# Patient Record
Sex: MALE | Race: WHITE | NOT HISPANIC OR LATINO | Employment: FULL TIME | URBAN - METROPOLITAN AREA
[De-identification: names, ages, dates, MRNs, and addresses within clinical notes are randomized per-mention and may not be internally consistent; named-entity substitution may affect disease eponyms.]

---

## 2017-04-06 ENCOUNTER — OFFICE VISIT (OUTPATIENT)
Dept: FAMILY MEDICINE | Facility: CLINIC | Age: 42
End: 2017-04-06
Payer: COMMERCIAL

## 2017-04-06 VITALS
BODY MASS INDEX: 31.36 KG/M2 | WEIGHT: 224 LBS | HEART RATE: 86 BPM | OXYGEN SATURATION: 100 % | HEIGHT: 71 IN | SYSTOLIC BLOOD PRESSURE: 144 MMHG | DIASTOLIC BLOOD PRESSURE: 90 MMHG

## 2017-04-06 DIAGNOSIS — Z23 NEED FOR VACCINATION: ICD-10-CM

## 2017-04-06 DIAGNOSIS — Z91.030 ALLERGIC TO BEES: ICD-10-CM

## 2017-04-06 DIAGNOSIS — Z80.42 FAMILY HISTORY OF PROSTATE CANCER: ICD-10-CM

## 2017-04-06 DIAGNOSIS — Z80.8 FAMILY HISTORY OF SKIN CANCER: ICD-10-CM

## 2017-04-06 DIAGNOSIS — I10 BENIGN ESSENTIAL HYPERTENSION: ICD-10-CM

## 2017-04-06 DIAGNOSIS — Z00.00 ROUTINE GENERAL MEDICAL EXAMINATION AT A HEALTH CARE FACILITY: Primary | ICD-10-CM

## 2017-04-06 PROCEDURE — 90471 IMMUNIZATION ADMIN: CPT | Performed by: PHYSICIAN ASSISTANT

## 2017-04-06 PROCEDURE — 90715 TDAP VACCINE 7 YRS/> IM: CPT | Performed by: PHYSICIAN ASSISTANT

## 2017-04-06 PROCEDURE — 99386 PREV VISIT NEW AGE 40-64: CPT | Mod: 25 | Performed by: PHYSICIAN ASSISTANT

## 2017-04-06 RX ORDER — EPINEPHRINE 0.3 MG/.3ML
INJECTION INTRAMUSCULAR
Refills: 0 | COMMUNITY
Start: 2016-06-10

## 2017-04-06 RX ORDER — EPINEPHRINE 0.3 MG/.3ML
0.3 INJECTION SUBCUTANEOUS PRN
Qty: 0.6 ML | Refills: 1 | Status: SHIPPED | OUTPATIENT
Start: 2017-04-06 | End: 2018-06-08

## 2017-04-06 RX ORDER — LOSARTAN POTASSIUM 50 MG/1
TABLET ORAL
Refills: 3 | COMMUNITY
Start: 2017-02-06

## 2017-04-06 NOTE — NURSING NOTE
"Chief Complaint   Patient presents with     Physical       Initial BP (!) 158/105  Pulse 86  Ht 5' 10.5\" (1.791 m)  Wt 224 lb (101.6 kg)  SpO2 100%  BMI 31.69 kg/m2 Estimated body mass index is 31.69 kg/(m^2) as calculated from the following:    Height as of this encounter: 5' 10.5\" (1.791 m).    Weight as of this encounter: 224 lb (101.6 kg).  Medication Reconciliation: complete  Shona Chandra CMA   Screening Questionnaire for Adult Immunization    Are you sick today?   No   Do you have allergies to medications, food, a vaccine component or latex?   No   Have you ever had a serious reaction after receiving a vaccination?   No   Do you have a long-term health problem with heart disease, lung disease, asthma, kidney disease, metabolic disease (e.g. diabetes), anemia, or other blood disorder?   No   Do you have cancer, leukemia, HIV/AIDS, or any other immune system problem?   No   In the past 3 months, have you taken medications that affect  your immune system, such as prednisone, other steroids, or anticancer drugs; drugs for the treatment of rheumatoid arthritis, Crohn s disease, or psoriasis; or have you had radiation treatments?   No   Have you had a seizure, or a brain or other nervous system problem?   No   During the past year, have you received a transfusion of blood or blood     products, or been given immune (gamma) globulin or antiviral drug?   No   For women: Are you pregnant or is there a chance you could become        pregnant during the next month?   No   Have you received any vaccinations in the past 4 weeks?   No     Immunization questionnaire answers were all negative.      MNVFC doesn't apply on this patient    Per orders of ADO, injection of tdap  given by Shona Hall. Patient instructed to remain in clinic for 20 minutes afterwards, and to report any adverse reaction to me immediately.       Screening performed by Shona Hall on 4/6/2017 at 6:12 PM.    "

## 2017-04-06 NOTE — MR AVS SNAPSHOT
After Visit Summary   4/6/2017    Dave Cherry    MRN: [id number]           Patient Information     Date Of Birth          1975        Visit Information        Provider Department      4/6/2017 5:50 PM Rebel Reyes PA-C [facility]        Today's Diagnoses     Routine general medical examination at a health care facility    -  1    Family history of prostate cancer        Need for vaccination        Family history of skin cancer        Allergic to bees          Care Instructions      Preventive Health Recommendations  Male Ages 40 to 49    Yearly exam:             See your health care provider every year in order to  o   Review health changes.   o   Discuss preventive care.    o   Review your medicines if your doctor has prescribed any.    You should be tested each year for STDs (sexually transmitted diseases) if you re at risk.     Have a cholesterol test every 5 years.     Have a colonoscopy (test for colon cancer) if someone in your family has had colon cancer or polyps before age 50.     After age 45, have a diabetes test (fasting glucose). If you are at risk for diabetes, you should have this test every 3 years.      Talk with your health care provider about whether or not a prostate cancer screening test (PSA) is right for you.    Shots: Get a flu shot each year. Get a tetanus shot every 10 years.     Nutrition:    Eat at least 5 servings of fruits and vegetables daily.     Eat whole-grain bread, whole-wheat pasta and brown rice instead of white grains and rice.     Talk to your provider about Calcium and Vitamin D.     Lifestyle    Exercise for at least 150 minutes a week (30 minutes a day, 5 days a week). This will help you control your weight and prevent disease.     Limit alcohol to one drink per day.     No smoking.     Wear sunscreen to prevent skin cancer.     See your dentist every six months for an exam and cleaning.            Follow-ups after your visit         Additional Services     DERMATOLOGY REFERRAL       Your provider has referred you to: [facility] - Brittny Dimas [phone]   [email or web address]    Please be aware that coverage of these services is subject to the terms and limitations of your health insurance plan.  Call member services at your health plan with any benefit or coverage questions.      Please bring the following with you to your appointment:    (1) Any X-Rays, CTs or MRIs which have been performed.  Contact the facility where they were done to arrange for  prior to your scheduled appointment.    (2) List of current medications  (3) This referral request   (4) Any documents/labs given to you for this referral                  Future tests that were ordered for you today     Open Future Orders        Priority Expected Expires Ordered    Lipid panel reflex to direct LDL Routine 5/6/2017 10/6/2017 4/6/2017    Basic metabolic panel Routine 5/6/2017 10/6/2017 4/6/2017    PSA, screen Routine 5/6/2017 10/6/2017 4/6/2017            Who to contact     If you have questions or need follow up information about today's clinic visit or your schedule please contact [facility] directly at [phone].  Normal or non-critical lab and imaging results will be communicated to you by MyChart, letter or phone within 4 business days after the clinic has received the results. If you do not hear from us within 7 days, please contact the clinic through MyChart or phone. If you have a critical or abnormal lab result, we will notify you by phone as soon as possible.  Submit refill requests through [employer] or call your pharmacy and they will forward the refill request to us. Please allow 3 business days for your refill to be completed.          Additional Information About Your Visit        [employer] Information     [employer] lets you send messages to your doctor, view your test results, renew your prescriptions,  "schedule appointments and more. To sign up, go to [email or web address] . Click on \"Log in\" on the left side of the screen, which will take you to the Welcome page. Then click on \"Sign up Now\" on the right side of the page.     You will be asked to enter the access code listed below, as well as some personal information. Please follow the directions to create your username and password.     Your access code is: [email or web address]  Expires: 2017  6:30 PM     Your access code will  in 90 days. If you need help or a new code, please call your [facility] clinic or [phone].        Care EveryWhere ID     This is your Care EveryWhere ID. This could be used by other organizations to access your [facility] medical records  [id number]        Your Vitals Were     Pulse Height Pulse Oximetry BMI (Body Mass Index)          86 5' 10.5\" (1.791 m) 100% 31.69 kg/m2         Blood Pressure from Last 3 Encounters:   17 (!) 142/96    Weight from Last 3 Encounters:   17 224 lb (101.6 kg)              We Performed the Following     1st  Administration  [55994]     DERMATOLOGY REFERRAL     TDAP VACCINE (ADACEL) [84688.002]          Today's Medication Changes          These changes are accurate as of: 17  6:30 PM.  If you have any questions, ask your nurse or doctor.               These medicines have changed or have updated prescriptions.        Dose/Directions    * EPIPEN 2-DEBORAH 0.3 MG/0.3ML injection   This may have changed:  Another medication with the same name was added. Make sure you understand how and when to take each.   Generic drug:  EPINEPHrine   Changed by:  Rebel Reyes PA-C        Reported on 2017   Refills:  0       * EPINEPHrine 0.3 MG/0.3ML injection   This may have changed:  You were already taking a medication with the same name, and this prescription was added. Make sure you understand how and when to take each.   Used for:  Allergic to bees   Changed by:  Rebel Reyes, " PA-C        Dose:  0.3 mg   Inject 0.3 mLs (0.3 mg) into the muscle as needed for anaphylaxis   Quantity:  0.6 mL   Refills:  1       * Notice:  This list has 2 medication(s) that are the same as other medications prescribed for you. Read the directions carefully, and ask your doctor or other care provider to review them with you.         Where to get your medicines      These medications were sent to [facility]/pharmacy #[id number] - [place], [place] - [address]  [address] [address]     Phone:  [phone]     EPINEPHrine 0.3 MG/0.3ML injection                Primary Care Provider Office Phone # Fax #    [facility] [phone] [address] [facility] [address]        Thank you!     Thank you for choosing [facility]  for your care. Our goal is always to provide you with excellent care. Hearing back from our patients is one way we can continue to improve our services. Please take a few minutes to complete the written survey that you may receive in the mail after your visit with us. Thank you!             Your Updated Medication List - Protect others around you: Learn how to safely use, store and throw away your medicines at www.disposemymeds.org.          This list is accurate as of: 4/6/17  6:30 PM.  Always use your most recent med list.                   Brand Name Dispense Instructions for use    * EPIPEN 2-DEBORAH 0.3 MG/0.3ML injection   Generic drug:  EPINEPHrine      Reported on 4/6/2017       * EPINEPHrine 0.3 MG/0.3ML injection     0.6 mL    Inject 0.3 mLs (0.3 mg) into the muscle as needed for anaphylaxis       losartan 50 MG tablet    COZAAR     TAKE 1 TABLET EVERY DAY       * Notice:  This list has 2 medication(s) that are the same as other medications prescribed for you. Read the directions carefully, and ask your doctor or other care provider to review them with you.

## 2017-04-06 NOTE — PROGRESS NOTES
SUBJECTIVE:     CC: Dave Cherry is an 41 year old male who presents for preventative health visit.     Healthy Habits:    Do you get at least three servings of calcium containing foods daily (dairy, green leafy vegetables, etc.)? yes     Amount of exercise or daily activities, outside of work: none recently     Problems taking medications regularly  -misses doses occasionally     Medication side effects: No    Have you had an eye exam in the past two years? yes    Do you see a dentist twice per year? Will be yes, was without insurance for a while and wasn't then.     Do you have sleep apnea, excessive snoring or daytime drowsiness?no    father recently passed away from skin cancer. Also had prostate cancer age 62? - would like to get prostate checked  Mole on back he would like evaluated      Today's PHQ-2 Score:   PHQ-2 ( 1999 Pfizer) 4/6/2017   Q1: Little interest or pleasure in doing things 0   Q2: Feeling down, depressed or hopeless 0   PHQ-2 Score 0       Abuse: Current or Past(Physical, Sexual or Emotional)- No  Do you feel safe in your environment - Yes    Social History   Substance Use Topics     Smoking status: Never Smoker     Smokeless tobacco: Not on file     Alcohol use Yes     The patient does not drink >3 drinks per day nor >7 drinks per week.    Last PSA: No results found for: PSA    No results for input(s): CHOL, HDL, LDL, TRIG, CHOLHDLRATIO, NHDL in the last 03694 hours.    Reviewed orders with patient. Reviewed health maintenance and updated orders accordingly - Yes    Reviewed and updated as needed this visit by clinical staff  Tobacco  Allergies  Meds  Problems  Med Hx  Surg Hx  Fam Hx  Soc Hx          Reviewed and updated as needed this visit by Provider  Allergies  Meds  Problems          Past Medical History:   Diagnosis Date     Hypertension       Past Surgical History:   Procedure Laterality Date     HEMORRHOIDECTOMY       ORTHOPEDIC SURGERY      right ankle surgery        ROS:  C: NEGATIVE for fever, chills, change in weight  I: NEGATIVE for worrisome rashes, moles or lesions  E: NEGATIVE for vision changes or irritation  ENT: NEGATIVE for ear, mouth and throat problems  R: NEGATIVE for significant cough or SOB  CV: NEGATIVE for chest pain, palpitations or peripheral edema  GI: NEGATIVE for nausea, abdominal pain, heartburn, or change in bowel habits   male: negative for dysuria, hematuria, decreased urinary stream, erectile dysfunction, urethral discharge  M: NEGATIVE for significant arthralgias or myalgia  N: NEGATIVE for weakness, dizziness or paresthesias  P: NEGATIVE for changes in mood or affect    Problem list, Medication list, Allergies, and Medical/Social/Surgical histories reviewed in [facility] and updated as appropriate.  Labs reviewed in EPIC  BP Readings from Last 3 Encounters:   17 144/90    Wt Readings from Last 3 Encounters:   17 224 lb (101.6 kg)                  Patient Active Problem List   Diagnosis     Family history of prostate cancer     Benign essential hypertension     Family history of skin cancer     Allergic to bees     BMI 31.0-31.9,adult     Past Surgical History:   Procedure Laterality Date     HEMORRHOIDECTOMY       ORTHOPEDIC SURGERY      right ankle surgery       Social History   Substance Use Topics     Smoking status: Never Smoker     Smokeless tobacco: Not on file     Alcohol use Yes     Family History   Problem Relation Age of Onset     Ovarian Cancer Mother      Stomach Cancer Mother      Prostate Cancer Father 61     Melanoma Father       age 71         Current Outpatient Prescriptions   Medication Sig Dispense Refill     losartan (COZAAR) 50 MG tablet TAKE 1 TABLET EVERY DAY  3     EPINEPHrine 0.3 MG/0.3ML injection Inject 0.3 mLs (0.3 mg) into the muscle as needed for anaphylaxis 0.6 mL 1     EPIPEN 2-DEBORAH 0.3 MG/0.3ML injection Reported on 2017  0     Allergies   Allergen Reactions     Bees      OBJECTIVE:     /90  " Pulse 86  Ht 5' 10.5\" (1.791 m)  Wt 224 lb (101.6 kg)  SpO2 100%  BMI 31.69 kg/m2  EXAM:  GENERAL: healthy, alert and no distress  EYES: Eyes grossly normal to inspection, PERRL and conjunctivae and sclerae normal  HENT: ear canals and TM's normal, nose and mouth without ulcers or lesions  NECK: no adenopathy, no asymmetry, masses, or scars and thyroid normal to palpation  RESP: lungs clear to auscultation - no rales, rhonchi or wheezes  CV: regular rate and rhythm, normal S1 S2, no S3 or S4, no murmur, click or rub, no peripheral edema and peripheral pulses strong  ABDOMEN: soft, nontender, no hepatosplenomegaly, no masses and bowel sounds normal   (male): normal male genitalia without lesions or urethral discharge, no hernia  MS: no gross musculoskeletal defects noted, no edema  SKIN: multiple skin moles  NEURO: Normal strength and tone, mentation intact and speech normal  PSYCH: mentation appears normal, affect normal/bright    ASSESSMENT/PLAN:         ICD-10-CM    1. Routine general medical examination at a health care facility Z00.00 Lipid panel reflex to direct LDL     Basic metabolic panel   2. Benign essential hypertension I10    3. Family history of prostate cancer Z80.42 PSA, screen   4. Family history of skin cancer Z80.8 DERMATOLOGY REFERRAL   5. Allergic to bees Z91.038 EPINEPHrine 0.3 MG/0.3ML injection   6. Need for vaccination Z23 TDAP VACCINE (ADACEL) [97467.002]     1st  Administration  [67977]   7. BMI 31.0-31.9,adult Z68.31    recheck blood pressure with pharmacy in 2 wks.   Work on Healthy diet and exercise. Getting heart rate elevated for 30 mins most days of week.  Recheck blood pressure in 6 months if at goal.   Follow up  With derm due to history of moles and fh.     COUNSELING:  Reviewed preventive health counseling, as reflected in patient instructions       Regular exercise       Healthy diet/nutrition       reports that he has never smoked. He does not have any smokeless tobacco " "history on file.    Estimated body mass index is 31.69 kg/(m^2) as calculated from the following:    Height as of this encounter: 5' 10.5\" (1.791 m).    Weight as of this encounter: 224 lb (101.6 kg).   Weight management plan: Discussed healthy diet and exercise guidelines and patient will follow up in 12 months in clinic to re-evaluate.    Counseling Resources:  ATP IV Guidelines  Pooled Cohorts Equation Calculator  FRAX Risk Assessment  ICSI Preventive Guidelines  Dietary Guidelines for Americans, 2010  USDA's MyPlate  ASA Prophylaxis  Lung CA Screening    Rebel Reyes PA-C  [facility]  "

## 2017-04-14 DIAGNOSIS — Z00.00 ROUTINE GENERAL MEDICAL EXAMINATION AT A HEALTH CARE FACILITY: ICD-10-CM

## 2017-04-14 DIAGNOSIS — Z80.42 FAMILY HISTORY OF PROSTATE CANCER: ICD-10-CM

## 2017-04-14 LAB
ANION GAP SERPL CALCULATED.3IONS-SCNC: 8 MMOL/L (ref 3–14)
BUN SERPL-MCNC: 15 MG/DL (ref 7–30)
CALCIUM SERPL-MCNC: 9.3 MG/DL (ref 8.5–10.1)
CHLORIDE SERPL-SCNC: 104 MMOL/L (ref 94–109)
CHOLEST SERPL-MCNC: 210 MG/DL
CO2 SERPL-SCNC: 25 MMOL/L (ref 20–32)
CREAT SERPL-MCNC: 1.15 MG/DL (ref 0.66–1.25)
GFR SERPL CREATININE-BSD FRML MDRD: 70 ML/MIN/1.7M2
GLUCOSE SERPL-MCNC: 115 MG/DL (ref 70–99)
HDLC SERPL-MCNC: 44 MG/DL
LDLC SERPL CALC-MCNC: 134 MG/DL
NONHDLC SERPL-MCNC: 166 MG/DL
POTASSIUM SERPL-SCNC: 4.4 MMOL/L (ref 3.4–5.3)
PSA SERPL-ACNC: 0.86 UG/L (ref 0–4)
SODIUM SERPL-SCNC: 137 MMOL/L (ref 133–144)
TRIGL SERPL-MCNC: 158 MG/DL

## 2017-04-14 PROCEDURE — 36415 COLL VENOUS BLD VENIPUNCTURE: CPT | Performed by: PHYSICIAN ASSISTANT

## 2017-04-14 PROCEDURE — 80061 LIPID PANEL: CPT | Performed by: PHYSICIAN ASSISTANT

## 2017-04-14 PROCEDURE — G0103 PSA SCREENING: HCPCS | Performed by: PHYSICIAN ASSISTANT

## 2017-04-14 PROCEDURE — 80048 BASIC METABOLIC PNL TOTAL CA: CPT | Performed by: PHYSICIAN ASSISTANT

## 2017-04-14 NOTE — PROGRESS NOTES
MrZac Cherry,    All of your labs were normal/  near normal  for you.    Please contact the clinic if you have additional questions.  Thank you.    Sincerely,    Rebel Reyes PA-C

## 2017-04-14 NOTE — LETTER
[facility]  [address] [address] [phone]        April 17, 2017    Dave Cherry  [address]  [place] [address]          Mr. Cherry,     All of your labs were normal/  near normal  for you.     Please contact the clinic if you have additional questions.  Thank you.     Sincerely,     Rebel MURRAYC/ks    Results for orders placed or performed in visit on 04/14/17   Lipid panel reflex to direct LDL   Result Value Ref Range    Cholesterol 210 (H) <200 mg/dL    Triglycerides 158 (H) <150 mg/dL    HDL Cholesterol 44 >39 mg/dL    LDL Cholesterol Calculated 134 (H) <100 mg/dL    Non HDL Cholesterol 166 (H) <130 mg/dL   Basic metabolic panel   Result Value Ref Range    Sodium 137 133 - 144 mmol/L    Potassium 4.4 3.4 - 5.3 mmol/L    Chloride 104 94 - 109 mmol/L    Carbon Dioxide 25 20 - 32 mmol/L    Anion Gap 8 3 - 14 mmol/L    Glucose 115 (H) 70 - 99 mg/dL    Urea Nitrogen 15 7 - 30 mg/dL    Creatinine 1.15 0.66 - 1.25 mg/dL    GFR Estimate 70 >60 mL/min/1.7m2    GFR Estimate If Black 85 >60 mL/min/1.7m2    Calcium 9.3 8.5 - 10.1 mg/dL   PSA, screen   Result Value Ref Range    PSA 0.86 0 - 4 ug/L

## 2017-06-08 ENCOUNTER — TELEPHONE (OUTPATIENT)
Dept: FAMILY MEDICINE | Facility: CLINIC | Age: 42
End: 2017-06-08

## 2017-06-08 NOTE — LETTER
[facility]  [address] [id number] [phone]          June 8, 2017    Dave Cherry  [address]  [place] [address]    Dave,      Our records indicate that you have not scheduled for a(n)blood pressure check with pharmacy which was recommended by your health care team.       Monitoring and managing your preventative and chronic health conditions are very important to us.     If you have received your health care elsewhere, please call the clinic so the information can be documented in your chart.    Please call [phone] or message us through your [email or web address] account to schedule an appointment or provide information for your chart.     I look forward to seeing you and working with you on your health care needs.     Sincerely,       Your [facility] Team/rb              *If you have already scheduled an appointment, please disregard this reminder

## 2017-06-08 NOTE — TELEPHONE ENCOUNTER
Panel Management Review      Patient has the following on his problem list:     Hypertension   Last three blood pressure readings:  BP Readings from Last 3 Encounters:   04/06/17 144/90   04/26/11 125/81     Blood pressure: FAILED    HTN Guidelines:  Age 18-59 BP range:  Less than 140/90  Age 60-85 with Diabetes:  Less than 140/90  Age 60-85 without Diabetes:  less than 150/90      Composite cancer screening  Chart review shows that this patient is due/due soon for the following None  Summary:    Patient is due/failing the following:   BP CHECK    Action needed:   BP check with pharmacy    Type of outreach:    Sent letter.    Questions for provider review:    None                                                                                                                                    Shona Chandra CMA       Chart routed to closed .

## 2017-09-15 ENCOUNTER — TELEPHONE (OUTPATIENT)
Dept: FAMILY MEDICINE | Facility: CLINIC | Age: 42
End: 2017-09-15

## 2017-09-15 NOTE — TELEPHONE ENCOUNTER
Panel Management Review      Patient has the following on his problem list:     Hypertension   Last three blood pressure readings:  BP Readings from Last 3 Encounters:   04/06/17 144/90   04/26/11 125/81     Blood pressure: FAILED    HTN Guidelines:  Age 18-59 BP range:  Less than 140/90  Age 60-85 with Diabetes:  Less than 140/90  Age 60-85 without Diabetes:  less than 150/90        Composite cancer screening  Chart review shows that this patient is due/due soon for the following None  Summary:    Patient is due/failing the following:   BP CHECK    Action needed:   Patient needs office visit for hypertension.    Type of outreach:    Sent letter.    Questions for provider review:    None                                                                                                                                    Shona Chandra CMA       Chart routed to closed .

## 2017-09-15 NOTE — LETTER
[facility]  [address] [address]  Phone: [phone]    09/15/17    Dave Cherry  [address]  [place] [address]      To whom it may concern:     Dave,      Our records indicate that you have not scheduled for a(n)appointment with  Rebel Reyes PA-C which was recommended by your health care team.       Monitoring and managing your preventative and chronic health conditions are very important to us.     If you have received your health care elsewhere, please call the clinic so the information can be documented in your chart.    Please call [phone] or message us through your [email or web address] account to schedule an appointment or provide information for your chart.     I look forward to seeing you and working with you on your health care needs.     Sincerely,       Your [facility]/rb              *If you have already scheduled an appointment, please disregard this reminder                        Sincerely,      Rebel Reyes PA-C

## 2017-11-25 ENCOUNTER — OFFICE VISIT (OUTPATIENT)
Dept: URGENT CARE | Facility: URGENT CARE | Age: 42
End: 2017-11-25
Payer: COMMERCIAL

## 2017-11-25 ENCOUNTER — RADIANT APPOINTMENT (OUTPATIENT)
Dept: GENERAL RADIOLOGY | Facility: CLINIC | Age: 42
End: 2017-11-25
Payer: COMMERCIAL

## 2017-11-25 VITALS
OXYGEN SATURATION: 98 % | BODY MASS INDEX: 30.81 KG/M2 | DIASTOLIC BLOOD PRESSURE: 94 MMHG | SYSTOLIC BLOOD PRESSURE: 133 MMHG | HEART RATE: 74 BPM | WEIGHT: 217.8 LBS | TEMPERATURE: 98.2 F

## 2017-11-25 DIAGNOSIS — S99.922A INJURY OF TOE ON LEFT FOOT, INITIAL ENCOUNTER: Primary | ICD-10-CM

## 2017-11-25 DIAGNOSIS — S99.922A INJURY OF TOE ON LEFT FOOT, INITIAL ENCOUNTER: ICD-10-CM

## 2017-11-25 DIAGNOSIS — S90.222A SUBUNGUAL HEMATOMA OF TOE OF LEFT FOOT, INITIAL ENCOUNTER: ICD-10-CM

## 2017-11-25 PROCEDURE — 73660 X-RAY EXAM OF TOE(S): CPT | Mod: LT

## 2017-11-25 PROCEDURE — 99213 OFFICE O/P EST LOW 20 MIN: CPT | Performed by: PHYSICIAN ASSISTANT

## 2017-11-25 NOTE — PROGRESS NOTES
SUBJECTIVE:   Dave Cherry is a 42 year old male who presents to clinic today for the following health issues:      Musculoskeletal problem/ Left toe pain      Duration: 1 day ago    Description  Location: left toe    Intensity:  8/10    Accompanying signs and symptoms: swelling and Discoloration (blue- greenish), throbbing pain    History  Previous similar problem: no   Previous evaluation:  none    Precipitating or alleviating factors:  Trauma or overuse: no   Aggravating factors include: walking    Therapies tried and outcome: ice and acetaminophen( tylenol)- didn't help to much     dropped a heavy sign on it.    TDAP UTD PER MIIC             Allergies   Allergen Reactions     Bees        Patient Active Problem List   Diagnosis     Family history of prostate cancer     Benign essential hypertension     Family history of skin cancer     Allergic to bees     BMI 31.0-31.9,adult       Past Medical History:   Diagnosis Date     Hypertension          Current Outpatient Prescriptions on File Prior to Visit:  EPIPEN 2-DEBORAH 0.3 MG/0.3ML injection Reported on 4/6/2017   losartan (COZAAR) 50 MG tablet TAKE 1 TABLET EVERY DAY   EPINEPHrine 0.3 MG/0.3ML injection Inject 0.3 mLs (0.3 mg) into the muscle as needed for anaphylaxis   ibuprofen (ADVIL,MOTRIN) 600 MG tablet Take 1 tablet by mouth every 8 hours as needed for pain.   atenolol (TENORMIN) 100 MG tablet Take 100 mg by mouth daily.   Citalopram Hydrobromide (CELEXA PO) Take  by mouth.     No current facility-administered medications on file prior to visit.     Social History   Substance Use Topics     Smoking status: Never Smoker     Smokeless tobacco: Not on file     Alcohol use Yes      Comment: occ       ROS:  GEN: no fever  SKIN: as above  Musculoskel: + as above    OBJECTIVE:  BP (!) 133/94 (BP Location: Left arm, Patient Position: Chair, Cuff Size: Adult Large)  Pulse 74  Temp 98.2  F (36.8  C) (Oral)  Wt 217 lb 12.8 oz (98.8 kg)  SpO2 98%  BMI 30.81  kg/m2   General:   awake, alert, and cooperative.  NAD.   Head: Normocephalic, atraumatic.  Eyes: Conjunctiva clear,   MS:  Left 1st toe with subungual hematoma, no other TTP or swelling. . Sensory intact.   Cap refill intact.    Neuro: Alert and oriented - normal speech.    My independent read of XR is  no fx/fb        ASSESSMENT:    ICD-10-CM    1. Injury of toe on left foot, initial encounter S99.922A XR Toe Left G/E 2 Views   2. Subungual hematoma of toe of left foot, initial encounter S90.222A            PLAN: verbal consent received, ice pack placed on toe then hematoma released with electric cautery.  bandaid applied, patient felt better after procedure and tolerated it well.   Advised about symptoms which might herald more serious problems.

## 2017-11-25 NOTE — MR AVS SNAPSHOT
After Visit Summary   11/25/2017    Dave Cherry    MRN: [id number]           Patient Information     Date Of Birth          1975        Visit Information        Provider Department      11/25/2017 12:00 PM Johnny Landaverde PA [facility]        Today's Diagnoses     Injury of toe on left foot, initial encounter    -  1    Subungual hematoma of toe of left foot, initial encounter          Care Instructions      Subungual Hematoma  A subungual hematoma is blood under the nail. It can occur when your finger or toe is hit or crushed. It causes the nail to look blue. In some cases, you may fracture the bone under the nail.   If the bruise is small and not too painful, it will heal without treatment. If the bruise is large and painful, you may need to have the blood drained.  If a large area of the nail is damaged, your doctor may want to remove it. If he or she does not remove the nail, it may become loose or fall off in the next 2 weeks. In almost all cases, the nail will grow back from the area under the cuticle called the matrix. This takes a few weeks to start and is complete in about 4 to 6 months for a fingernail and 12 months for a toenail. If the nail bed or matrix was damaged, the nail may grow back with a rough or irregular shape. Sometimes the nail may not regrow at all.  Home care  The following guidelines will help you care for your wound at home:    Apply an ice pack (ice cubes in a plastic bag, wrapped in a thin towel) for no more than 20 minutes every 3 to 6 hours for the first 1 to 2 days. Continue this, as needed, 3 to 4 times a day until the pain and swelling goes away.    If the nail was drained:    Keep the nail covered with a clean adhesive bandage for the next 2 days. There may be some oozing of blood during that time, so change the bandage as needed.    Rinse the finger or toe once a day under warm running water. Clean any crust away with a  cotton-tipped applicator soaked in soapy water.    If the nail was removed:    The nail bed (tissue under the nail) is moist, soft and sensitive. This needs to be protected from injury for the first 7 to 10 days until it dries out and becomes hard. Keep it covered with a dressing or adhesive bandage until that time.    Bandages tend to stick to a newly exposed nail bed and can be hard to remove if left in place more than 24 hours. Therefore, unless you were told otherwise, change dressings every 24 hours. Apply petroleum jelly and then a non-stick dressing. This will keep the bandage from sticking and make it easier to remove.  If necessary, soak the dressing off while holding your finger or toe under warm running water.    If an X-ray showed a fracture, protect the finger or toe for 3 to 4 weeks while it is healing.  Here is some information regarding medicine and your wound:    You can take over-the-counter pain medicine such as acetaminophen for pain, unless you were given a different pain medicine to use. Talk with your healthcare provider before using this medicine if you have chronic liver or kidney disease. Also talk with your provider if you have had a stomach ulcer or digestive tract bleeding, or you are taking blood-thinner medicine.    If you were given antibiotics, take them until they are used up. It is important to finish the antibiotics even if the wound looks better. This will ensure the infection has cleared.  Follow-up care  Follow up with your doctor, or as advised. If the nail was drained, there is a small risk of infection. Watch carefully for the signs listed below.  When to seek medical advice  Call your doctor right away if any of these occur:    Increasing redness around the nail    Increasing local pain or swelling    Pus draining from the nail    Fever of 100.4 F (38 C) or higher, or as directed by your healthcare provider  Date Last Reviewed: 9/1/2016 2000-2017 [employer]  "[employer] [address] [place] [address]. All rights reserved. This information is not intended as a substitute for professional medical care. Always follow your healthcare professional's instructions.                Follow-ups after your visit        Follow-up notes from your care team     Return if symptoms worsen or fail to improve.      Who to contact     If you have questions or need follow up information about today's clinic visit or your schedule please contact [facility] directly at [phone].  Normal or non-critical lab and imaging results will be communicated to you by MyChart, letter or phone within 4 business days after the clinic has received the results. If you do not hear from us within 7 days, please contact the clinic through [employer] or phone. If you have a critical or abnormal lab result, we will notify you by phone as soon as possible.  Submit refill requests through [employer] or call your pharmacy and they will forward the refill request to us. Please allow 3 business days for your refill to be completed.          Additional Information About Your Visit        [employer] Information     [employer] lets you send messages to your doctor, view your test results, renew your prescriptions, schedule appointments and more. To sign up, go to [email or web address] . Click on \"Log in\" on the left side of the screen, which will take you to the Welcome page. Then click on \"Sign up Now\" on the right side of the page.     You will be asked to enter the access code listed below, as well as some personal information. Please follow the directions to create your username and password.     Your access code is: [email or web address]  Expires: 2018  1:32 PM     Your access code will  in 90 days. If you need help or a new code, please call your [place] clinic or [phone].        Care EveryWhere ID     This is your Care EveryWhere ID. This could be used by other organizations to access " your [facility] medical records  [id number]        Your Vitals Were     Pulse Temperature Pulse Oximetry BMI (Body Mass Index)          74 98.2  F (36.8  C) (Oral) 98% 30.81 kg/m2         Blood Pressure from Last 3 Encounters:   11/25/17 (!) 133/94   04/06/17 144/90   04/26/11 125/81    Weight from Last 3 Encounters:   11/25/17 217 lb 12.8 oz (98.8 kg)   04/06/17 224 lb (101.6 kg)   04/26/11 220 lb (99.8 kg)               Primary Care Provider Office Phone # Fax #    [facility] [phone] [address] [address] [address]        Equal Access to Services     [employer] : Hadii aad ku hadasho Soomaali, waaxda luqadaha, qaybta kaalmada adeegyada, judy barron. So [facility] [phone].    ATENCIÓN: Si habla español, tiene a sanders disposición servicios gratuitos de asistencia lingüística. Llame al [phone].    We comply with applicable federal civil rights laws and [place] laws. We do not discriminate on the basis of race, color, national origin, age, disability, sex, sexual orientation, or gender identity.            Thank you!     Thank you for choosing [facility]  for your care. Our goal is always to provide you with excellent care. Hearing back from our patients is one way we can continue to improve our services. Please take a few minutes to complete the written survey that you may receive in the mail after your visit with us. Thank you!             Your Updated Medication List - Protect others around you: Learn how to safely use, store and throw away your medicines at www.disposemymeds.org.          This list is accurate as of: 11/25/17  1:32 PM.  Always use your most recent med list.                   Brand Name Dispense Instructions for use Diagnosis    atenolol 100 MG tablet    TENORMIN     Take 100 mg by mouth daily.        CELEXA PO      Take  by mouth.        * EPIPEN 2-DEBORAH 0.3 MG/0.3ML injection 2-pack   Generic drug:   EPINEPHrine      Reported on 4/6/2017        * EPINEPHrine 0.3 MG/0.3ML injection 2-pack    EPIPEN/ADRENACLICK/or ANY BX GENERIC EQUIV    0.6 mL    Inject 0.3 mLs (0.3 mg) into the muscle as needed for anaphylaxis    Allergic to bees       ibuprofen 600 MG tablet    ADVIL/MOTRIN    100 tablet    Take 1 tablet by mouth every 8 hours as needed for pain.    Toe injury, Work-related condition       losartan 50 MG tablet    COZAAR     TAKE 1 TABLET EVERY DAY        * Notice:  This list has 2 medication(s) that are the same as other medications prescribed for you. Read the directions carefully, and ask your doctor or other care provider to review them with you.

## 2017-12-28 ENCOUNTER — TELEPHONE (OUTPATIENT)
Dept: FAMILY MEDICINE | Facility: CLINIC | Age: 42
End: 2017-12-28

## 2017-12-28 NOTE — TELEPHONE ENCOUNTER
Panel Management Review      Patient has the following on his problem list:     Hypertension   Last three blood pressure readings:  BP Readings from Last 3 Encounters:   11/25/17 (!) 133/94   04/06/17 144/90   04/26/11 125/81     Blood pressure: FAILED    HTN Guidelines:  Age 18-59 BP range:  Less than 140/90  Age 60-85 with Diabetes:  Less than 140/90  Age 60-85 without Diabetes:  less than 150/90        Composite cancer screening  Chart review shows that this patient is due/due soon for the following None  Summary:    Patient is due/failing the following:   BP CHECK    Action needed:   Patient needs office visit for hypertension.    Type of outreach:    Sent letter.    Questions for provider review:    None                                                                                                                                    Shona Chandra CMA       Chart routed to closed .

## 2017-12-28 NOTE — LETTER
[facility]  [address] [address] [address]  Phone: [phone]    12/28/17    Dave Cherry  [address]  [place] [address]      To whom it may concern:     Dave,      Our records indicate that you have not scheduled for a(n)appointment with  Rebel Reyes PA-C which was recommended by your health care team.     Monitoring and managing your preventative and chronic health conditions are very important to us.     If you have received your health care elsewhere, please call the clinic so the information can be documented in your chart.    Please call [phone] or message us through your [employer] account to schedule an appointment or provide information for your chart.     I look forward to seeing you and working with you on your health care needs.     Sincerely,       Your [place] [facility] [facility] Team/rb              *If you have already scheduled an appointment, please disregard this reminder

## 2018-04-13 ENCOUNTER — TELEPHONE (OUTPATIENT)
Dept: FAMILY MEDICINE | Facility: CLINIC | Age: 43
End: 2018-04-13

## 2018-04-13 NOTE — LETTER
[facility]  [address] [address] [facility] [address]  Phone: [phone]    04/13/18    Dave Cherry  [address]  [place] [address]      To whom it may concern:     Dave,      Our records indicate that you have not scheduled for a(n)appointment with  Rebel Reyes PA-C which was recommended by your health care team.     Monitoring and managing your preventative and chronic health conditions are very important to us.     If you have received your health care elsewhere, please call the clinic so the information can be documented in your chart.    Please call [phone] or message us through your [employer] account to schedule an appointment or provide information for your chart.     I look forward to seeing you and working with you on your health care needs.     Sincerely,       Your [place] [facility] Team/rb              *If you have already scheduled an appointment, please disregard this reminder

## 2018-06-08 DIAGNOSIS — Z91.030 ALLERGIC TO BEES: ICD-10-CM

## 2018-06-08 RX ORDER — EPINEPHRINE 0.3 MG/.3ML
INJECTION SUBCUTANEOUS
Qty: 0.6 ML | Refills: 0 | Status: SHIPPED | OUTPATIENT
Start: 2018-06-08

## 2018-08-07 ENCOUNTER — OFFICE VISIT (OUTPATIENT)
Dept: URGENT CARE | Facility: URGENT CARE | Age: 43
End: 2018-08-07
Payer: COMMERCIAL

## 2018-08-07 ENCOUNTER — RADIANT APPOINTMENT (OUTPATIENT)
Dept: GENERAL RADIOLOGY | Facility: CLINIC | Age: 43
End: 2018-08-07
Attending: PHYSICIAN ASSISTANT
Payer: COMMERCIAL

## 2018-08-07 VITALS
WEIGHT: 213 LBS | OXYGEN SATURATION: 97 % | HEART RATE: 64 BPM | TEMPERATURE: 97.8 F | SYSTOLIC BLOOD PRESSURE: 127 MMHG | DIASTOLIC BLOOD PRESSURE: 79 MMHG | BODY MASS INDEX: 30.13 KG/M2

## 2018-08-07 DIAGNOSIS — S49.91XA INJURY OF RIGHT UPPER ARM, INITIAL ENCOUNTER: ICD-10-CM

## 2018-08-07 DIAGNOSIS — S62.354A CLOSED NONDISPLACED FRACTURE OF SHAFT OF FOURTH METACARPAL BONE OF RIGHT HAND, INITIAL ENCOUNTER: Primary | ICD-10-CM

## 2018-08-07 PROCEDURE — 73130 X-RAY EXAM OF HAND: CPT | Mod: RT

## 2018-08-07 PROCEDURE — 73030 X-RAY EXAM OF SHOULDER: CPT | Mod: RT

## 2018-08-07 PROCEDURE — 99214 OFFICE O/P EST MOD 30 MIN: CPT | Performed by: PHYSICIAN ASSISTANT

## 2018-08-07 RX ORDER — HYDROCODONE BITARTRATE AND ACETAMINOPHEN 5; 325 MG/1; MG/1
1 TABLET ORAL EVERY 4 HOURS PRN
Qty: 15 TABLET | Refills: 0 | Status: SHIPPED | OUTPATIENT
Start: 2018-08-07 | End: 2018-08-10

## 2018-08-07 ASSESSMENT — ENCOUNTER SYMPTOMS
WEIGHT LOSS: 0
CONSTITUTIONAL NEGATIVE: 1
PALPITATIONS: 0
HEMOPTYSIS: 0
RESPIRATORY NEGATIVE: 1
CARDIOVASCULAR NEGATIVE: 1
EYE PAIN: 0
FALLS: 1
GASTROINTESTINAL NEGATIVE: 1
DIAPHORESIS: 0
FEVER: 0
COUGH: 0

## 2018-08-07 NOTE — MR AVS SNAPSHOT
After Visit Summary   8/7/2018    Dave Cherry    MRN: [id number]           Patient Information     Date Of Birth          1975        Visit Information        Provider Department      8/7/2018 8:15 PM Tamie Brown PA-C [facility]        Today's Diagnoses     Closed nondisplaced fracture of shaft of fourth metacarpal bone of right hand, initial encounter    -  1    Injury of right upper arm, initial encounter           Follow-ups after your visit        Additional Services     ORTHO  REFERRAL       [facility] is referring you to the Orthopedic  Services at [facility].       The  Representative will assist you in the coordination of your Orthopedic and Musculoskeletal Care as prescribed by your physician.    The  Representative will call you within 1 business day to help schedule your appointment, or you may contact the  Representative at:    All areas ~ [phone]     Type of Referral : Non Surgical       Timeframe requested: 1 - 2 days    Coverage of these services is subject to the terms and limitations of your health insurance plan.  Please call member services at your health plan with any benefit or coverage questions.      If X-rays, CT or MRI's have been performed, please contact the facility where they were done to arrange for , prior to your scheduled appointment.  Please bring this referral request to your appointment and present it to your specialist.                  Who to contact     If you have questions or need follow up information about today's clinic visit or your schedule please contact [facility] directly at [phone].  Normal or non-critical lab and imaging results will be communicated to you by MyChart, letter or phone within 4 business days after the clinic has received the results. If you do not hear from us within 7 days, please contact the  clinic through [employer] or phone. If you have a critical or abnormal lab result, we will notify you by phone as soon as possible.  Submit refill requests through [employer] or call your pharmacy and they will forward the refill request to us. Please allow 3 business days for your refill to be completed.          Additional Information About Your Visit        Care EveryWhere ID     This is your Care EveryWhere ID. This could be used by other organizations to access your [place] medical records  [id number]        Your Vitals Were     Pulse Temperature Pulse Oximetry BMI (Body Mass Index)          64 97.8  F (36.6  C) (Oral) 97% 30.13 kg/m2         Blood Pressure from Last 3 Encounters:   08/07/18 127/79   11/25/17 (!) 133/94   04/06/17 144/90    Weight from Last 3 Encounters:   08/07/18 213 lb (96.6 kg)   11/25/17 217 lb 12.8 oz (98.8 kg)   04/06/17 224 lb (101.6 kg)              We Performed the Following     ORTHO  REFERRAL     XR Hand Right G/E 3 Views     XR Shoulder Right G/E 3 Views          Today's Medication Changes          These changes are accurate as of 8/7/18  8:43 PM.  If you have any questions, ask your nurse or doctor.               Start taking these medicines.        Dose/Directions    HYDROcodone-acetaminophen 5-325 MG per tablet   Commonly known as:  NORCO   Used for:  Closed nondisplaced fracture of shaft of fourth metacarpal bone of right hand, initial encounter   Started by:  Tamie Brown PA-C        Dose:  1 tablet   Take 1 tablet by mouth every 4 hours as needed for pain   Quantity:  15 tablet   Refills:  0            Where to get your medicines      Some of these will need a paper prescription and others can be bought over the counter.  Ask your nurse if you have questions.     Bring a paper prescription for each of these medications     HYDROcodone-acetaminophen 5-325 MG per tablet               Information about OPIOIDS     PRESCRIPTION OPIOIDS: WHAT YOU NEED TO KNOW   We gave you  an opioid (narcotic) pain medicine. It is important to manage your pain, but opioids are not always the best choice. You should first try all the other options your care team gave you. Take this medicine for as short a time (and as few doses) as possible.    Some activities can increase your pain, such as bandage changes or therapy sessions. It may help to take your pain medicine 30 to 60 minutes before these activities. Reduce your stress by getting enough sleep, working on hobbies you enjoy and practicing relaxation or meditation. Talk to your care team about ways to manage your pain beyond prescription opioids.    These medicines have risks:    DO NOT drive when on new or higher doses of pain medicine. These medicines can affect your alertness and reaction times, and you could be arrested for driving under the influence (DUI). If you need to use opioids long-term, talk to your care team about driving.    DO NOT operate heavy machinery    DO NOT do any other dangerous activities while taking these medicines.    DO NOT drink any alcohol while taking these medicines.     If the opioid prescribed includes acetaminophen, DO NOT take with any other medicines that contain acetaminophen. Read all labels carefully. Look for the word  acetaminophen  or  Tylenol.  Ask your pharmacist if you have questions or are unsure.    You can get addicted to pain medicines, especially if you have a history of addiction (chemical, alcohol or substance dependence). Talk to your care team about ways to reduce this risk.    All opioids tend to cause constipation. Drink plenty of water and eat foods that have a lot of fiber, such as fruits, vegetables, prune juice, apple juice and high-fiber cereal. Take a laxative (Miralax, milk of magnesia, Colace, Senna) if you don t move your bowels at least every other day. Other side effects include upset stomach, sleepiness, dizziness, throwing up, tolerance (needing more of the medicine to have the  same effect), physical dependence and slowed breathing.    Store your pills in a secure place, locked if possible. We will not replace any lost or stolen medicine. If you don t finish your medicine, please throw away (dispose) as directed by your pharmacist. The [place] Pollution Control Agency has more information about safe disposal: https://www.pca.[facility].mn.us/living-green/managing-unwanted-medications         Primary Care Provider Office Phone # Fax #    [facility] [phone] [address] [address]        Equal Access to Services     [facility] : Hadii aad ku hadasho Soomaali, waaxda luqadaha, qaybta kaalmada adeegyada, judy angel . So [facility] [phone].    ATENCIÓN: Si habla español, tiene a sanders disposición servicios gratuitos de asistencia lingüística. Llame al [phone].    We comply with applicable federal civil rights laws and [place] laws. We do not discriminate on the basis of race, color, national origin, age, disability, sex, sexual orientation, or gender identity.            Thank you!     Thank you for choosing [facility]  for your care. Our goal is always to provide you with excellent care. Hearing back from our patients is one way we can continue to improve our services. Please take a few minutes to complete the written survey that you may receive in the mail after your visit with us. Thank you!             Your Updated Medication List - Protect others around you: Learn how to safely use, store and throw away your medicines at www.disposemymeds.org.          This list is accurate as of 8/7/18  8:43 PM.  Always use your most recent med list.                   Brand Name Dispense Instructions for use Diagnosis    CELEXA PO      Take  by mouth.        * EPIPEN 2-DEBORAH 0.3 MG/0.3ML injection 2-pack   Generic drug:  EPINEPHrine      Reported on 4/6/2017        * EPINEPHrine 0.3 MG/0.3ML injection 2-pack     EPIPEN/ADRENACLICK/or ANY BX GENERIC EQUIV    0.6 mL    INJECT 0.3 MLS (0.3 MG) INTO THE MUSCLE AS NEEDED FOR ANAPHYLAXIS    Allergic to bees       HYDROcodone-acetaminophen 5-325 MG per tablet    NORCO    15 tablet    Take 1 tablet by mouth every 4 hours as needed for pain    Closed nondisplaced fracture of shaft of fourth metacarpal bone of right hand, initial encounter       ibuprofen 600 MG tablet    ADVIL/MOTRIN    100 tablet    Take 1 tablet by mouth every 8 hours as needed for pain.    Toe injury, Work-related condition       losartan 50 MG tablet    COZAAR     TAKE 1 TABLET EVERY DAY        * Notice:  This list has 2 medication(s) that are the same as other medications prescribed for you. Read the directions carefully, and ask your doctor or other care provider to review them with you.
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
## 2018-08-10 ENCOUNTER — OFFICE VISIT (OUTPATIENT)
Dept: ORTHOPEDICS | Facility: CLINIC | Age: 43
End: 2018-08-10
Payer: COMMERCIAL

## 2018-08-10 VITALS
DIASTOLIC BLOOD PRESSURE: 82 MMHG | WEIGHT: 217.2 LBS | HEIGHT: 70 IN | BODY MASS INDEX: 31.09 KG/M2 | SYSTOLIC BLOOD PRESSURE: 140 MMHG

## 2018-08-10 DIAGNOSIS — S62.324A CLOSED DISPLACED FRACTURE OF SHAFT OF FOURTH METACARPAL BONE OF RIGHT HAND, INITIAL ENCOUNTER: ICD-10-CM

## 2018-08-10 PROCEDURE — 26600 TREAT METACARPAL FRACTURE: CPT | Performed by: PEDIATRICS

## 2018-08-10 PROCEDURE — 99204 OFFICE O/P NEW MOD 45 MIN: CPT | Mod: 57 | Performed by: PEDIATRICS

## 2018-08-10 RX ORDER — HYDROCODONE BITARTRATE AND ACETAMINOPHEN 5; 325 MG/1; MG/1
1 TABLET ORAL EVERY 6 HOURS PRN
Qty: 15 TABLET | Refills: 0 | Status: SHIPPED | OUTPATIENT
Start: 2018-08-10 | End: 2018-08-21
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
## 2018-08-10 NOTE — MR AVS SNAPSHOT
After Visit Summary   8/10/2018    Dave Cherry    MRN: [id number]           Patient Information     Date Of Birth          1975        Visit Information        Provider Department      8/10/2018 2:20 PM Jose Epstein DO [facility] [place]        Today's Diagnoses     Closed displaced fracture of shaft of fourth metacarpal bone of right hand, initial encounter          Care Instructions       Caring for Your Cast     A cast is used to protect an injured body part and allow it to heal by limiting the amount of motion occurring around the injury. Pain and swelling of the injured area is normal for 48 hours after your cast is put on. If you have swelling, wiggle your toes or fingers to ease it. Doing so encourages blood flow to your arm or leg.     It is important that you keep your cast dry, unless your doctor tells you differently. If the padding of the cast gets wet, your skin may be damaged and become infected. When showering or taking a bath, put the cast in a heavy plastic bag that can be held in place with a rubber band. If your cast gets wet and does not dry out in four to five hours, call your doctor s office.   To keep the cast clean, use wash clothes or baby wipes around it.   You may experience some itching inside the cast. This is normal. Avoid putting anything in the cast, even your finger, as you can injure your skin and cause infection. Try shaking some talcum powder or blowing cool air from a hair dryer into the cast to ease itching.   If these signs or symptoms develop, call your doctor immediately.       Pain gets worse     Swelling that cuts off blood flow that does not go away, even when you lift the body part above the level of your heart     Fever after itching. It may be related to an infection.     Fluid draining from your skin under the cast     Your cast may become loose as swelling goes down. If the cast feels too loose or if it is so  "loose you can take it off, call your doctor s office.     Your doctor or  will give you recommendations for activity based on your injury. Some sports allow casts if properly padded by a doctor or .     For complete healing, your cast should only be removed at the direction of your doctor or clinic staff. A special saw ensures its safe removal and protects the skin and other tissue under the cast.             Follow-ups after your visit        Follow-up notes from your care team     Return in about 1 week (around 8/17/2018).      Your next 10 appointments already scheduled     Aug 17, 2018  2:20 PM CDT   Return Visit with Jose Epstein,    [facility] [place] ([facility] [place])    [address]  [place] [address] [phone]              Who to contact     If you have questions or need follow up information about today's clinic visit or your schedule please contact [facility] [place] directly at [phone].  Normal or non-critical lab and imaging results will be communicated to you by MyChart, letter or phone within 4 business days after the clinic has received the results. If you do not hear from us within 7 days, please contact the clinic through MyChart or phone. If you have a critical or abnormal lab result, we will notify you by phone as soon as possible.  Submit refill requests through [employer] or call your pharmacy and they will forward the refill request to us. Please allow 3 business days for your refill to be completed.          Additional Information About Your Visit        Care EveryWhere ID     This is your Care EveryWhere ID. This could be used by other organizations to access your [facility] medical records  [id number]        Your Vitals Were     Height BMI (Body Mass Index)                5' 9.69\" (1.77 m) 31.45 kg/m2           Blood Pressure from Last 3 Encounters: "   08/10/18 140/82   08/07/18 127/79   11/25/17 (!) 133/94    Weight from Last 3 Encounters:   08/10/18 217 lb 3.2 oz (98.5 kg)   08/07/18 213 lb (96.6 kg)   11/25/17 217 lb 12.8 oz (98.8 kg)              We Performed the Following     Cast application          Today's Medication Changes          These changes are accurate as of 8/10/18 11:59 PM.  If you have any questions, ask your nurse or doctor.               These medicines have changed or have updated prescriptions.        Dose/Directions    HYDROcodone-acetaminophen 5-325 MG per tablet   Commonly known as:  NORCO   This may have changed:  when to take this   Used for:  Closed displaced fracture of shaft of fourth metacarpal bone of right hand, initial encounter        Dose:  1 tablet   Take 1 tablet by mouth every 6 hours as needed for pain   Quantity:  15 tablet   Refills:  0            Where to get your medicines      Some of these will need a paper prescription and others can be bought over the counter.  Ask your nurse if you have questions.     Bring a paper prescription for each of these medications     HYDROcodone-acetaminophen 5-325 MG per tablet               Information about OPIOIDS     PRESCRIPTION OPIOIDS: WHAT YOU NEED TO KNOW   We gave you an opioid (narcotic) pain medicine. It is important to manage your pain, but opioids are not always the best choice. You should first try all the other options your care team gave you. Take this medicine for as short a time (and as few doses) as possible.    Some activities can increase your pain, such as bandage changes or therapy sessions. It may help to take your pain medicine 30 to 60 minutes before these activities. Reduce your stress by getting enough sleep, working on hobbies you enjoy and practicing relaxation or meditation. Talk to your care team about ways to manage your pain beyond prescription opioids.    These medicines have risks:    DO NOT drive when on new or higher doses of pain medicine. These  medicines can affect your alertness and reaction times, and you could be arrested for driving under the influence (DUI). If you need to use opioids long-term, talk to your care team about driving.    DO NOT operate heavy machinery    DO NOT do any other dangerous activities while taking these medicines.    DO NOT drink any alcohol while taking these medicines.     If the opioid prescribed includes acetaminophen, DO NOT take with any other medicines that contain acetaminophen. Read all labels carefully. Look for the word  acetaminophen  or  Tylenol.  Ask your pharmacist if you have questions or are unsure.    You can get addicted to pain medicines, especially if you have a history of addiction (chemical, alcohol or substance dependence). Talk to your care team about ways to reduce this risk.    All opioids tend to cause constipation. Drink plenty of water and eat foods that have a lot of fiber, such as fruits, vegetables, prune juice, apple juice and high-fiber cereal. Take a laxative (Miralax, milk of magnesia, Colace, Senna) if you don t move your bowels at least every other day. Other side effects include upset stomach, sleepiness, dizziness, throwing up, tolerance (needing more of the medicine to have the same effect), physical dependence and slowed breathing.    Store your pills in a secure place, locked if possible. We will not replace any lost or stolen medicine. If you don t finish your medicine, please throw away (dispose) as directed by your pharmacist. The [place] Pollution Control Agency has more information about safe disposal: https://www.pca.[facility].mn.us/living-green/managing-unwanted-medications         Primary Care Provider Office Phone # Fax #    [facility] [phone] [address] [address]        Equal Access to Services     ANYA NGO AH: Alek Boudreaux, dayanna rosales, judy lynne  la'citlalin anderson. So [facility] [phone].    ATENCIÓN: Si tanala betsy, tiene a sanders disposición servicios gratuitos de asistencia lingüística. Edmond mtz [phone].    We comply with applicable federal civil rights laws and [place] laws. We do not discriminate on the basis of race, color, national origin, age, disability, sex, sexual orientation, or gender identity.            Thank you!     Thank you for choosing [facility] [place]  for your care. Our goal is always to provide you with excellent care. Hearing back from our patients is one way we can continue to improve our services. Please take a few minutes to complete the written survey that you may receive in the mail after your visit with us. Thank you!             Your Updated Medication List - Protect others around you: Learn how to safely use, store and throw away your medicines at www.disposemymeds.org.          This list is accurate as of 8/10/18 11:59 PM.  Always use your most recent med list.                   Brand Name Dispense Instructions for use Diagnosis    CELEXA PO      Take  by mouth.        * EPIPEN 2-DEBORAH 0.3 MG/0.3ML injection 2-pack   Generic drug:  EPINEPHrine      Reported on 4/6/2017        * EPINEPHrine 0.3 MG/0.3ML injection 2-pack    EPIPEN/ADRENACLICK/or ANY BX GENERIC EQUIV    0.6 mL    INJECT 0.3 MLS (0.3 MG) INTO THE MUSCLE AS NEEDED FOR ANAPHYLAXIS    Allergic to bees       HYDROcodone-acetaminophen 5-325 MG per tablet    NORCO    15 tablet    Take 1 tablet by mouth every 6 hours as needed for pain    Closed displaced fracture of shaft of fourth metacarpal bone of right hand, initial encounter       ibuprofen 600 MG tablet    ADVIL/MOTRIN    100 tablet    Take 1 tablet by mouth every 8 hours as needed for pain.    Toe injury, Work-related condition       losartan 50 MG tablet    COZAAR     TAKE 1 TABLET EVERY DAY        * Notice:  This list has 2 medication(s) that are the same as other medications prescribed for  you. Read the directions carefully, and ask your doctor or other care provider to review them with you.

## 2018-08-10 NOTE — PATIENT INSTRUCTIONS
Caring for Your Cast     A cast is used to protect an injured body part and allow it to heal by limiting the amount of motion occurring around the injury. Pain and swelling of the injured area is normal for 48 hours after your cast is put on. If you have swelling, wiggle your toes or fingers to ease it. Doing so encourages blood flow to your arm or leg.     It is important that you keep your cast dry, unless your doctor tells you differently. If the padding of the cast gets wet, your skin may be damaged and become infected. When showering or taking a bath, put the cast in a heavy plastic bag that can be held in place with a rubber band. If your cast gets wet and does not dry out in four to five hours, call your doctor s office.   To keep the cast clean, use wash clothes or baby wipes around it.   You may experience some itching inside the cast. This is normal. Avoid putting anything in the cast, even your finger, as you can injure your skin and cause infection. Try shaking some talcum powder or blowing cool air from a hair dryer into the cast to ease itching.   If these signs or symptoms develop, call your doctor immediately.       Pain gets worse     Swelling that cuts off blood flow that does not go away, even when you lift the body part above the level of your heart     Fever after itching. It may be related to an infection.     Fluid draining from your skin under the cast     Your cast may become loose as swelling goes down. If the cast feels too loose or if it is so loose you can take it off, call your doctor s office.     Your doctor or  will give you recommendations for activity based on your injury. Some sports allow casts if properly padded by a doctor or .     For complete healing, your cast should only be removed at the direction of your doctor or clinic staff. A special saw ensures its safe removal and protects the skin and other tissue under the cast.

## 2018-08-17 ENCOUNTER — OFFICE VISIT (OUTPATIENT)
Dept: ORTHOPEDICS | Facility: CLINIC | Age: 43
End: 2018-08-17
Payer: COMMERCIAL

## 2018-08-17 ENCOUNTER — RADIANT APPOINTMENT (OUTPATIENT)
Dept: GENERAL RADIOLOGY | Facility: CLINIC | Age: 43
End: 2018-08-17
Attending: PEDIATRICS
Payer: COMMERCIAL

## 2018-08-17 VITALS
SYSTOLIC BLOOD PRESSURE: 138 MMHG | BODY MASS INDEX: 31.07 KG/M2 | HEIGHT: 70 IN | WEIGHT: 217 LBS | DIASTOLIC BLOOD PRESSURE: 96 MMHG

## 2018-08-17 DIAGNOSIS — S62.324A CLOSED DISPLACED FRACTURE OF SHAFT OF FOURTH METACARPAL BONE OF RIGHT HAND, INITIAL ENCOUNTER: Primary | ICD-10-CM

## 2018-08-17 DIAGNOSIS — S62.324A CLOSED DISPLACED FRACTURE OF SHAFT OF FOURTH METACARPAL BONE OF RIGHT HAND, INITIAL ENCOUNTER: ICD-10-CM

## 2018-08-17 PROCEDURE — 73130 X-RAY EXAM OF HAND: CPT | Mod: RT | Performed by: PEDIATRICS

## 2018-08-17 PROCEDURE — 99213 OFFICE O/P EST LOW 20 MIN: CPT | Performed by: PEDIATRICS

## 2018-08-17 NOTE — MR AVS SNAPSHOT
After Visit Summary   8/17/2018    Dave Cherry    MRN: [id number]           Patient Information     Date Of Birth          1975        Visit Information        Provider Department      8/17/2018 2:20 PM Jose Epstein,  [facility] [place]        Today's Diagnoses     Closed displaced fracture of shaft of fourth metacarpal bone of right hand, initial encounter    -  1       Follow-ups after your visit        Additional Services     [facility] PT, HAND, AND CHIROPRACTIC REFERRAL       **This order will print in the [facility] Scheduling Office**    Physical Therapy, Hand Therapy and Chiropractic Care are available through:    *[facility]  *[facility]  *[facility]    Call one number to schedule at any of the above locations: [phone].    Your provider has referred you to: Hand Therapy    Indication/Reason for Referral: Hand Pain  Onset of Illness: 8/7/18  Therapy Orders: Evaluate and Treat  Special Programs: Custom Splint Fabrication   Special Request: Custom Ulnar gutter splint fabrication to include metacarpals 3-5     April Chen      Additional Comments for the Therapist or Chiropractor:     Please be aware that coverage of these services is subject to the terms and limitations of your health insurance plan.  Call member services at your health plan with any benefit or coverage questions.      Please bring the following to your appointment:    *Your personal calendar for scheduling future appointments  *Comfortable clothing                  Who to contact     If you have questions or need follow up information about today's clinic visit or your schedule please contact [facility] [place] directly at [phone].  Normal or non-critical lab and imaging results will be communicated to you by MyChart, letter or phone within 4 business days after the clinic has received the results. If you  "do not hear from us within 7 days, please contact the clinic through [employer] or phone. If you have a critical or abnormal lab result, we will notify you by phone as soon as possible.  Submit refill requests through [employer] or call your pharmacy and they will forward the refill request to us. Please allow 3 business days for your refill to be completed.          Additional Information About Your Visit        Care EveryWhere ID     This is your Care EveryWhere ID. This could be used by other organizations to access your [facility] medical records  [id number]        Your Vitals Were     Height BMI (Body Mass Index)                5' 9.75\" (1.772 m) 31.36 kg/m2           Blood Pressure from Last 3 Encounters:   08/17/18 (!) 138/96   08/10/18 140/82   08/07/18 127/79    Weight from Last 3 Encounters:   08/17/18 217 lb (98.4 kg)   08/10/18 217 lb 3.2 oz (98.5 kg)   08/07/18 213 lb (96.6 kg)              We Performed the Following     [facility] PT, HAND, AND CHIROPRACTIC REFERRAL        Primary Care Provider Office Phone # Fax #    [facility] [phone] [address] [address] [address]        Equal Access to Services     ANYA NGO : Hadii aad ku hadasho Soomaali, waaxda luqadaha, qaybta kaalmada adeegyada, waxay lluviain haycitlalin liz gibbs laharitha barron. So [facility] [phone].    ATENCIÓN: Si habla español, tiene a sanders disposición servicios gratuitos de asistencia lingüística. Llame al [phone].    We comply with applicable federal civil rights laws and [place] laws. We do not discriminate on the basis of race, color, national origin, age, disability, sex, sexual orientation, or gender identity.            Thank you!     Thank you for choosing [facility]  for your care. Our goal is always to provide you with excellent care. Hearing back from our patients is one way we can continue to improve our services. Please take a few minutes to complete the written " survey that you may receive in the mail after your visit with us. Thank you!             Your Updated Medication List - Protect others around you: Learn how to safely use, store and throw away your medicines at www.disposemymeds.org.          This list is accurate as of 8/17/18  3:07 PM.  Always use your most recent med list.                   Brand Name Dispense Instructions for use Diagnosis    CELEXA PO      Take  by mouth.        * EPIPEN 2-DEBORAH 0.3 MG/0.3ML injection 2-pack   Generic drug:  EPINEPHrine      Reported on 4/6/2017        * EPINEPHrine 0.3 MG/0.3ML injection 2-pack    EPIPEN/ADRENACLICK/or ANY BX GENERIC EQUIV    0.6 mL    INJECT 0.3 MLS (0.3 MG) INTO THE MUSCLE AS NEEDED FOR ANAPHYLAXIS    Allergic to bees       HYDROcodone-acetaminophen 5-325 MG per tablet    NORCO    15 tablet    Take 1 tablet by mouth every 6 hours as needed for pain    Closed displaced fracture of shaft of fourth metacarpal bone of right hand, initial encounter       ibuprofen 600 MG tablet    ADVIL/MOTRIN    100 tablet    Take 1 tablet by mouth every 8 hours as needed for pain.    Toe injury, Work-related condition       losartan 50 MG tablet    COZAAR     TAKE 1 TABLET EVERY DAY        * Notice:  This list has 2 medication(s) that are the same as other medications prescribed for you. Read the directions carefully, and ask your doctor or other care provider to review them with you.

## 2018-08-17 NOTE — LETTER
"    8/17/2018         RE: Dave Cherry  [address]  [place] [address]        Dear Colleague,    Thank you for referring your patient, Dave Cherry, to the [facility] [place]. Please see a copy of my visit note below.    Sports Medicine Clinic Visit    PCP: Clinic, [facility]    Dave Cherry is a 43 year old male who is seen in f/u up for Closed displaced fracture of shaft of fourth metacarpal bone of right hand, initial encounter. Now 10 days out from injury.  Since last visit on 8/10/2018 patient splint removed and repeat radiograph taken prior to seeing the patient.  Improving pain over fracture site, swelling much improved.  Tenderness noted over fracture site.    Review of Systems  All other systems reviewed and are negative unless noted above.    Past Medical History:   Diagnosis Date     Hypertension      Past Surgical History:   Procedure Laterality Date     HEMORRHOIDECTOMY       ORTHOPEDIC SURGERY      right ankle surgery         Objective  BP (!) 138/96  Ht 5' 9.75\" (1.772 m)  Wt 217 lb (98.4 kg)  BMI 31.36 kg/m2    GENERAL APPEARANCE: healthy, alert and no distress   GAIT: NORMAL  SKIN: no suspicious lesions or rashes  NEURO: Normal strength and tone, mentation intact and speech normal  PSYCH:  mentation appears normal and affect normal/bright  HEENT: no scleral icterus  CV: no extremity edema  RESP: nonlabored breathing    Exam:  Right hand:  Regional pulses normal.  Capillary refill less than 2 seconds.  Normal sensation noted.   Swelling improved, some persists. Fading ecchymosis.  Mild to moderate limitation in digit motion with stiffness, some pain.  No clear rotational deformity.  Some palpable prominence at dorsal proximal aspect of fracture, but no clear tenting.    Radiology  Visualized radiographs of right hand obtained today, and reviewed the images with the patient.  Impression: Three views of the right hand demonstrate once again an " oblique fourth metacarpal fracture. No significant overall change in appearance when compared to films from 8/7/18.      Assessment:  1. Closed displaced fracture of shaft of fourth metacarpal bone of right hand, initial encounter        Plan:  Discussed the assessment with the patient.  Reviewed again nature of fracture, fracture healing.  No significant rotational deformity appreciated on exam.  During discussion of nature of fracture, patient did have brief vasovagal episode that responded with time, he had returned to baseline by the end of the visit.  Reviewed options for mobilization, continue with splint, casting, custom splint.  EXOS fracture brace did not appear to be appropriately sized.  Following discussion of options, patient would like something that has potential to get what.  We discussed proper care with immobilizing device if getting wet, area must be thoroughly dried afterward.  Referred to hand therapy for custom molded ulnar gutter splint to protect fourth metacarpal fracture.  Follow up around 4 weeks from injury, repeat radiographs of the hand, sooner if needed.  Questions answered. The patient indicates understanding of these issues and agrees with the plan.    Patient to follow up with Primary Care provider regarding elevated blood pressure.    Jose Epstein DO, CAQ          Disclaimer: This note consists of symbols derived from keyboarding, dictation and/or voice recognition software. As a result, there may be errors in the script that have gone undetected. Please consider this when interpreting information found in this chart.        Again, thank you for allowing me to participate in the care of your patient.        Sincerely,        Jose [place] DO

## 2018-08-17 NOTE — PROGRESS NOTES
"Sports Medicine Clinic Visit    PCP: Clinic, [facility]    Dave Cherry is a 43 year old male who is seen in f/u up for Closed displaced fracture of shaft of fourth metacarpal bone of right hand, initial encounter. Now 10 days out from injury.  Since last visit on 8/10/2018 patient splint removed and repeat radiograph taken prior to seeing the patient.  Improving pain over fracture site, swelling much improved.  Tenderness noted over fracture site.    Review of Systems  All other systems reviewed and are negative unless noted above.    Past Medical History:   Diagnosis Date     Hypertension      Past Surgical History:   Procedure Laterality Date     HEMORRHOIDECTOMY       ORTHOPEDIC SURGERY      right ankle surgery         Objective  BP (!) 138/96  Ht 5' 9.75\" (1.772 m)  Wt 217 lb (98.4 kg)  BMI 31.36 kg/m2    GENERAL APPEARANCE: healthy, alert and no distress   GAIT: NORMAL  SKIN: no suspicious lesions or rashes  NEURO: Normal strength and tone, mentation intact and speech normal  PSYCH:  mentation appears normal and affect normal/bright  HEENT: no scleral icterus  CV: no extremity edema  RESP: nonlabored breathing    Exam:  Right hand:  Regional pulses normal.  Capillary refill less than 2 seconds.  Normal sensation noted.   Swelling improved, some persists. Fading ecchymosis.  Mild to moderate limitation in digit motion with stiffness, some pain.  No clear rotational deformity.  Some palpable prominence at dorsal proximal aspect of fracture, but no clear tenting.    Radiology  Visualized radiographs of right hand obtained today, and reviewed the images with the patient.  Impression: Three views of the right hand demonstrate once again an oblique fourth metacarpal fracture. No significant overall change in appearance when compared to films from 8/7/18.      Assessment:  1. Closed displaced fracture of shaft of fourth metacarpal bone of right hand, initial encounter        Plan:  Discussed the " assessment with the patient.  Reviewed again nature of fracture, fracture healing.  No significant rotational deformity appreciated on exam.  During discussion of nature of fracture, patient did have brief vasovagal episode that responded with time, he had returned to baseline by the end of the visit.  Reviewed options for mobilization, continue with splint, casting, custom splint.  EXOS fracture brace did not appear to be appropriately sized.  Following discussion of options, patient would like something that has potential to get what.  We discussed proper care with immobilizing device if getting wet, area must be thoroughly dried afterward.  Referred to hand therapy for custom molded ulnar gutter splint to protect fourth metacarpal fracture.  Follow up around 4 weeks from injury, repeat radiographs of the hand, sooner if needed.  Questions answered. The patient indicates understanding of these issues and agrees with the plan.    Patient to follow up with Primary Care provider regarding elevated blood pressure.    Jose Epstein DO, CAQ          Disclaimer: This note consists of symbols derived from keyboarding, dictation and/or voice recognition software. As a result, there may be errors in the script that have gone undetected. Please consider this when interpreting information found in this chart.

## 2018-08-21 ENCOUNTER — TELEPHONE (OUTPATIENT)
Dept: ORTHOPEDICS | Facility: CLINIC | Age: 43
End: 2018-08-21

## 2018-08-21 DIAGNOSIS — S62.324D CLOSED DISPLACED FRACTURE OF SHAFT OF FOURTH METACARPAL BONE OF RIGHT HAND WITH ROUTINE HEALING, SUBSEQUENT ENCOUNTER: ICD-10-CM

## 2018-08-21 RX ORDER — HYDROCODONE BITARTRATE AND ACETAMINOPHEN 5; 325 MG/1; MG/1
1 TABLET ORAL EVERY 6 HOURS PRN
Qty: 15 TABLET | Refills: 0 | Status: SHIPPED | OUTPATIENT
Start: 2018-08-21 | End: 2018-09-04

## 2018-08-22 NOTE — TELEPHONE ENCOUNTER
Patient LVM requesting RX be sent downstairs to the [facility] pharmacy.

## 2018-08-23 ENCOUNTER — THERAPY VISIT (OUTPATIENT)
Dept: OCCUPATIONAL THERAPY | Facility: CLINIC | Age: 43
End: 2018-08-23
Payer: COMMERCIAL

## 2018-08-23 DIAGNOSIS — S62.324D CLOSED DISPLACED FRACTURE OF SHAFT OF FOURTH METACARPAL BONE OF RIGHT HAND WITH ROUTINE HEALING, SUBSEQUENT ENCOUNTER: ICD-10-CM

## 2018-08-23 DIAGNOSIS — M79.641 RIGHT HAND PAIN: Primary | ICD-10-CM

## 2018-08-23 PROCEDURE — 97760 ORTHOTIC MGMT&TRAING 1ST ENC: CPT | Mod: GO | Performed by: OCCUPATIONAL THERAPIST
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
## 2018-08-23 NOTE — MR AVS SNAPSHOT
After Visit Summary   8/23/2018    Dave Cherry    MRN: [id number]           Patient Information     Date Of Birth          1975        Visit Information        Provider Department      8/23/2018 2:00 PM Taylor Damon [facility] [place]        Today's Diagnoses     Right hand pain    -  1    Closed displaced fracture of shaft of fourth metacarpal bone of right hand with routine healing, subsequent encounter           Follow-ups after your visit        Your next 10 appointments already scheduled     Sep 04, 2018  6:20 PM CDT   Return Visit with Jose Epstein DO   [facility] [place] ([facility] [place])    [address]  [place] [address] [phone]              Who to contact     If you have questions or need follow up information about today's clinic visit or your schedule please contact [facility] [place] directly at [phone].  Normal or non-critical lab and imaging results will be communicated to you by MyChart, letter or phone within 4 business days after the clinic has received the results. If you do not hear from us within 7 days, please contact the clinic through [email or web address] or phone. If you have a critical or abnormal lab result, we will notify you by phone as soon as possible.  Submit refill requests through [employer] or call your pharmacy and they will forward the refill request to us. Please allow 3 business days for your refill to be completed.          Additional Information About Your Visit        Care EveryWhere ID     This is your Care EveryWhere ID. This could be used by other organizations to access your [facility] medical records  [id number]         Blood Pressure from Last 3 Encounters:   08/17/18 (!) 138/96   08/10/18 140/82   08/07/18 127/79    Weight from Last 3 Encounters:   08/17/18 98.4 kg (217 lb)   08/10/18 98.5 kg (217 lb 3.2 oz)    08/07/18 96.6 kg (213 lb)              We Performed the Following     ORTHOTIC MGMT AND TRAINING, EACH 15 MIN        Primary Care Provider Office Phone # Fax #    [facility] [phone] [address] [facility] [address]        Equal Access to Services     ANYA NGO : Hadii aad ku hadasho Soomaali, waaxda luqadaha, qaybta kaalmada adeegyada, waxay lluviain haycitlalin liz mominnoemikaren barron. So [facility] [phone].    ATENCIÓN: Si habla español, tiene a sanders disposición servicios gratuitos de asistencia lingüística. Edmond al [phone].    We comply with applicable federal civil rights laws and [place] laws. We do not discriminate on the basis of race, color, national origin, age, disability, sex, sexual orientation, or gender identity.            Thank you!     Thank you for choosing [facility]  for your care. Our goal is always to provide you with excellent care. Hearing back from our patients is one way we can continue to improve our services. Please take a few minutes to complete the written survey that you may receive in the mail after your visit with us. Thank you!             Your Updated Medication List - Protect others around you: Learn how to safely use, store and throw away your medicines at www.disposemymeds.org.          This list is accurate as of 8/23/18  2:58 PM.  Always use your most recent med list.                   Brand Name Dispense Instructions for use Diagnosis    CELEXA PO      Take  by mouth.        * EPIPEN 2-DEBORAH 0.3 MG/0.3ML injection 2-pack   Generic drug:  EPINEPHrine      Reported on 4/6/2017        * EPINEPHrine 0.3 MG/0.3ML injection 2-pack    EPIPEN/ADRENACLICK/or ANY BX GENERIC EQUIV    0.6 mL    INJECT 0.3 MLS (0.3 MG) INTO THE MUSCLE AS NEEDED FOR ANAPHYLAXIS    Allergic to bees       HYDROcodone-acetaminophen 5-325 MG per tablet    NORCO    15 tablet    Take 1 tablet by mouth every 6 hours as needed  for pain    Closed displaced fracture of shaft of fourth metacarpal bone of right hand with routine healing, subsequent encounter       ibuprofen 600 MG tablet    ADVIL/MOTRIN    100 tablet    Take 1 tablet by mouth every 8 hours as needed for pain.    Toe injury, Work-related condition       losartan 50 MG tablet    COZAAR     TAKE 1 TABLET EVERY DAY        * Notice:  This list has 2 medication(s) that are the same as other medications prescribed for you. Read the directions carefully, and ask your doctor or other care provider to review them with you.

## 2018-09-04 ENCOUNTER — OFFICE VISIT (OUTPATIENT)
Dept: ORTHOPEDICS | Facility: CLINIC | Age: 43
End: 2018-09-04
Payer: COMMERCIAL

## 2018-09-04 ENCOUNTER — RADIANT APPOINTMENT (OUTPATIENT)
Dept: GENERAL RADIOLOGY | Facility: CLINIC | Age: 43
End: 2018-09-04
Attending: PEDIATRICS
Payer: COMMERCIAL

## 2018-09-04 VITALS
DIASTOLIC BLOOD PRESSURE: 88 MMHG | SYSTOLIC BLOOD PRESSURE: 146 MMHG | BODY MASS INDEX: 30.98 KG/M2 | HEIGHT: 70 IN | WEIGHT: 216.4 LBS

## 2018-09-04 DIAGNOSIS — S62.324D CLOSED DISPLACED FRACTURE OF SHAFT OF FOURTH METACARPAL BONE OF RIGHT HAND WITH ROUTINE HEALING, SUBSEQUENT ENCOUNTER: Primary | ICD-10-CM

## 2018-09-04 DIAGNOSIS — S62.324D CLOSED DISPLACED FRACTURE OF SHAFT OF FOURTH METACARPAL BONE OF RIGHT HAND WITH ROUTINE HEALING, SUBSEQUENT ENCOUNTER: ICD-10-CM

## 2018-09-04 PROCEDURE — 99207 ZZC FRACTURE CARE IN GLOBAL PERIOD: CPT | Performed by: PEDIATRICS

## 2018-09-04 PROCEDURE — 73130 X-RAY EXAM OF HAND: CPT | Mod: RT | Performed by: PEDIATRICS

## 2018-09-04 NOTE — NURSING NOTE
Patient to follow up with Primary Care provider regarding elevated blood pressure.    Loraine Quesada M.Ed., ATR, ATC

## 2018-09-04 NOTE — LETTER
"    9/4/2018         RE: Dave Cherry  [id number]  [place] [address]        Dear Colleague,    Thank you for referring your patient, Dave Cherry, to the [facility]. Please see a copy of my visit note below.    Sports Medicine Clinic Visit    PCP: Clinic, [facility] [place]    Dave Cherry is a 43 year old male who is seen in f/u up for Closed displaced fracture of shaft of fourth metacarpal bone of right hand with routine healing, subsequent encounter. Now 4 weeks out from injury.  Since last visit on 8/21/2018 patient splint removed and repeat radiograph taken prior to seeing the patient. He notes that he has occasional pain with pressing buttons at work and using the hand. He has been trying to rest it as much as possible. He is wearing the splint all the time other than removing for bathing. He is taking Tylenol PRN for pain.    **  Most days no pain. Sometimes pain at end of work day.       Review of Systems  All other systems reviewed and are negative unless noted above.    Past Medical History:   Diagnosis Date     Hypertension      Past Surgical History:   Procedure Laterality Date     HEMORRHOIDECTOMY       ORTHOPEDIC SURGERY      right ankle surgery         Objective  /88  Ht 5' 9.75\" (1.772 m)  Wt 216 lb 6.4 oz (98.2 kg)  BMI 31.27 kg/m2    GENERAL APPEARANCE: healthy, alert and no distress   GAIT: NORMAL  SKIN: no suspicious lesions or rashes  NEURO: Normal strength and tone, mentation intact and speech normal  PSYCH:  mentation appears normal and affect normal/bright  HEENT: no scleral icterus  CV: no extremity edema  RESP: nonlabored breathing    Exam:  Right hand  Regional pulses normal.  Capillary refill less than 2 seconds.  Normal sensation noted.   No deformity noted at hand.  No clear rotational deformity.  Mildly tender fracture site, dorsal hand  Stiffness with composite ring finger flexion, no significant pain " noted    Radiology  Visualized radiographs of right hand obtained today, and reviewed the images with the patient.  Impression: Three views of the right hand demonstrate once again an oblique fracture of the fourth metacarpal, unchanged in alignment compared to prior films.  There appears to be faint interval healing compared to images from 8/17/18, though fracture remains apparent and there is no significant callus formation.      Assessment:  1. Closed displaced fracture of shaft of fourth metacarpal bone of right hand with routine healing, subsequent encounter        Plan:  Discussed the assessment with the patient and his wife.  Overall clinically improving, minimal radiographic improvement, though I do think some is present particularly at the distal aspect fracture.  No significant callus formation just yet.  We discussed continued use of the splint, anticipate this for now.  Hopefully continued clinical and radiographic improvement over the next 2-3 weeks.  If he is improving in comfort level, may start to come out of the splint during the day, work on gentle wrist and digit range of motion provided remaining pain-free.  Recheck 2-3 weeks, repeat radiographs of the right hand, sooner if needed.  Continue with activity restrictions in the meantime.  Questions answered. The patient indicates understanding of these issues and agrees with the plan.    Jose Epstein DO, CAQ            Disclaimer: This note consists of symbols derived from keyboarding, dictation and/or voice recognition software. As a result, there may be errors in the script that have gone undetected. Please consider this when interpreting information found in this chart.        Again, thank you for allowing me to participate in the care of your patient.        Sincerely,        Jose Epstein DO

## 2018-09-04 NOTE — PROGRESS NOTES
"Sports Medicine Clinic Visit    PCP: Clinic, [facility]    Dave WU Cherry is a 43 year old male who is seen in f/u up for Closed displaced fracture of shaft of fourth metacarpal bone of right hand with routine healing, subsequent encounter. Now 4 weeks out from injury.  Since last visit on 8/21/2018 patient splint removed and repeat radiograph taken prior to seeing the patient. He notes that he has occasional pain with pressing buttons at work and using the hand. He has been trying to rest it as much as possible. He is wearing the splint all the time other than removing for bathing. He is taking Tylenol PRN for pain.    **  Most days no pain. Sometimes pain at end of work day.       Review of Systems  All other systems reviewed and are negative unless noted above.    Past Medical History:   Diagnosis Date     Hypertension      Past Surgical History:   Procedure Laterality Date     HEMORRHOIDECTOMY       ORTHOPEDIC SURGERY      right ankle surgery         Objective  /88  Ht 5' 9.75\" (1.772 m)  Wt 216 lb 6.4 oz (98.2 kg)  BMI 31.27 kg/m2    GENERAL APPEARANCE: healthy, alert and no distress   GAIT: NORMAL  SKIN: no suspicious lesions or rashes  NEURO: Normal strength and tone, mentation intact and speech normal  PSYCH:  mentation appears normal and affect normal/bright  HEENT: no scleral icterus  CV: no extremity edema  RESP: nonlabored breathing    Exam:  Right hand  Regional pulses normal.  Capillary refill less than 2 seconds.  Normal sensation noted.   No deformity noted at hand.  No clear rotational deformity.  Mildly tender fracture site, dorsal hand  Stiffness with composite ring finger flexion, no significant pain noted    Radiology  Visualized radiographs of right hand obtained today, and reviewed the images with the patient.  Impression: Three views of the right hand demonstrate once again an oblique fracture of the fourth metacarpal, unchanged in alignment compared to prior films.  There " appears to be faint interval healing compared to images from 8/17/18, though fracture remains apparent and there is no significant callus formation.      Assessment:  1. Closed displaced fracture of shaft of fourth metacarpal bone of right hand with routine healing, subsequent encounter        Plan:  Discussed the assessment with the patient and his wife.  Overall clinically improving, minimal radiographic improvement, though I do think some is present particularly at the distal aspect fracture.  No significant callus formation just yet.  We discussed continued use of the splint, anticipate this for now.  Hopefully continued clinical and radiographic improvement over the next 2-3 weeks.  If he is improving in comfort level, may start to come out of the splint during the day, work on gentle wrist and digit range of motion provided remaining pain-free.  Recheck 2-3 weeks, repeat radiographs of the right hand, sooner if needed.  Continue with activity restrictions in the meantime.  Questions answered. The patient indicates understanding of these issues and agrees with the plan.    Jose Epstein DO, CAQ            Disclaimer: This note consists of symbols derived from keyboarding, dictation and/or voice recognition software. As a result, there may be errors in the script that have gone undetected. Please consider this when interpreting information found in this chart.

## 2018-09-04 NOTE — MR AVS SNAPSHOT
"              After Visit Summary   9/4/2018    Dave Cherry    MRN: [id number]           Patient Information     Date Of Birth          1975        Visit Information        Provider Department      9/4/2018 6:20 PM Jose Epstein DO [facility]        Today's Diagnoses     Closed displaced fracture of shaft of fourth metacarpal bone of right hand with routine healing, subsequent encounter    -  1      Care Instructions    Continue splinting for 2-3 weeks    Follow up in clinic in 2-3 weeks for re-check           Follow-ups after your visit        Who to contact     If you have questions or need follow up information about today's clinic visit or your schedule please contact [facility] [place] directly at [phone].  Normal or non-critical lab and imaging results will be communicated to you by MyChart, letter or phone within 4 business days after the clinic has received the results. If you do not hear from us within 7 days, please contact the clinic through MyChart or phone. If you have a critical or abnormal lab result, we will notify you by phone as soon as possible.  Submit refill requests through [employer] or call your pharmacy and they will forward the refill request to us. Please allow 3 business days for your refill to be completed.          Additional Information About Your Visit        Care EveryWhere ID     This is your Care EveryWhere ID. This could be used by other organizations to access your [facility] medical records  [id number]        Your Vitals Were     Height BMI (Body Mass Index)                5' 9.75\" (1.772 m) 31.27 kg/m2           Blood Pressure from Last 3 Encounters:   09/04/18 146/88   08/17/18 (!) 138/96   08/10/18 140/82    Weight from Last 3 Encounters:   09/04/18 216 lb 6.4 oz (98.2 kg)   08/17/18 217 lb (98.4 kg)   08/10/18 217 lb 3.2 oz (98.5 kg)               Primary Care Provider Office Phone # Fax #    " [facility] [phone]       [address] [address] [address]        Equal Access to Services     ANYA NGO : Hadkeon aad ku hadbrooke Boudreaux, dayanna rosales, mague kasantiago tabares, judy kennycarito barron. So [facility] [phone].    ATENCIÓN: Si habla español, tiene a sanders disposición servicios gratuitos de asistencia lingüística. Llame al [phone].    We comply with applicable federal civil rights laws and [place] laws. We do not discriminate on the basis of race, color, national origin, age, disability, sex, sexual orientation, or gender identity.            Thank you!     Thank you for choosing [facility]  for your care. Our goal is always to provide you with excellent care. Hearing back from our patients is one way we can continue to improve our services. Please take a few minutes to complete the written survey that you may receive in the mail after your visit with us. Thank you!             Your Updated Medication List - Protect others around you: Learn how to safely use, store and throw away your medicines at www.disposemymeds.org.          This list is accurate as of 9/4/18  7:04 PM.  Always use your most recent med list.                   Brand Name Dispense Instructions for use Diagnosis    CELEXA PO      Take  by mouth.        * EPIPEN 2-DEBORAH 0.3 MG/0.3ML injection 2-pack   Generic drug:  EPINEPHrine      Reported on 4/6/2017        * EPINEPHrine 0.3 MG/0.3ML injection 2-pack    EPIPEN/ADRENACLICK/or ANY BX GENERIC EQUIV    0.6 mL    INJECT 0.3 MLS (0.3 MG) INTO THE MUSCLE AS NEEDED FOR ANAPHYLAXIS    Allergic to bees       ibuprofen 600 MG tablet    ADVIL/MOTRIN    100 tablet    Take 1 tablet by mouth every 8 hours as needed for pain.    Toe injury, Work-related condition       losartan 50 MG tablet    COZAAR     TAKE 1 TABLET EVERY DAY        * Notice:  This list has 2 medication(s) that are the same as other  medications prescribed for you. Read the directions carefully, and ask your doctor or other care provider to review them with you.

## 2018-10-02 ENCOUNTER — OFFICE VISIT (OUTPATIENT)
Dept: ORTHOPEDICS | Facility: CLINIC | Age: 43
End: 2018-10-02
Payer: COMMERCIAL

## 2018-10-02 ENCOUNTER — RADIANT APPOINTMENT (OUTPATIENT)
Dept: GENERAL RADIOLOGY | Facility: CLINIC | Age: 43
End: 2018-10-02
Attending: PEDIATRICS
Payer: COMMERCIAL

## 2018-10-02 VITALS
WEIGHT: 218 LBS | DIASTOLIC BLOOD PRESSURE: 84 MMHG | HEIGHT: 70 IN | SYSTOLIC BLOOD PRESSURE: 138 MMHG | BODY MASS INDEX: 31.21 KG/M2

## 2018-10-02 DIAGNOSIS — S62.324D CLOSED DISPLACED FRACTURE OF SHAFT OF FOURTH METACARPAL BONE OF RIGHT HAND WITH ROUTINE HEALING, SUBSEQUENT ENCOUNTER: ICD-10-CM

## 2018-10-02 DIAGNOSIS — S62.324D CLOSED DISPLACED FRACTURE OF SHAFT OF FOURTH METACARPAL BONE OF RIGHT HAND WITH ROUTINE HEALING, SUBSEQUENT ENCOUNTER: Primary | ICD-10-CM

## 2018-10-02 PROCEDURE — 73130 X-RAY EXAM OF HAND: CPT | Mod: RT | Performed by: PEDIATRICS

## 2018-10-02 PROCEDURE — 99207 ZZC FRACTURE CARE IN GLOBAL PERIOD: CPT | Performed by: PEDIATRICS

## 2018-10-02 NOTE — MR AVS SNAPSHOT
After Visit Summary   10/2/2018    Dave Cherry    MRN: [id number]           Patient Information     Date Of Birth          1975        Visit Information        Provider Department      10/2/2018 6:20 PM Jose Epstein,  [facility] [place]        Today's Diagnoses     Closed displaced fracture of shaft of fourth metacarpal bone of right hand with routine healing, subsequent encounter    -  1      Care Instructions    *Elevate right hand above elbow as much as possible    *May begin to ween use of splint    *Occupational Therapy           Follow-ups after your visit        Additional Services     [facility] PT, HAND, AND CHIROPRACTIC REFERRAL       Physical Therapy, Hand Therapy and Chiropractic Care are available through:  *[facility]  *Hand Therapy (Occupational Therapy or Physical Therapy)  *[facility]    Call one number to schedule at any of the above locations: [phone].    Physical therapy, Hand therapy and/or Chiropractic care has been recommended by your physician as an excellent treatment option to reduce pain and help people return to normal activities, including sports.  Therapy and/or chiropractic care services are a great complement or alternative to expensive and invasive surgery, injections, or long-term use of prescription medications. The primary goal is to identify the underlying problem and provide you the tools to manage your condition on your own.     Please be aware that coverage of these services is subject to the terms and limitations of your health insurance plan.  Call member services at your health plan with any benefit or coverage questions.      Please bring the following to your appointment:  *Your personal calendar for scheduling future appointments  *Comfortable clothing                  Follow-up notes from your care team     Return in about 4 weeks (around 10/30/2018).      Your next  "10 appointments already scheduled     Oct 23, 2018  6:20 PM CDT   Return Visit with Jose Epstein,    [facility] [place] ([facility] [place])    [address]  [place] [address] [phone]              Future tests that were ordered for you today     Open Future Orders        Priority Expected Expires Ordered    [facility] PT, HAND, AND CHIROPRACTIC REFERRAL Routine  10/2/2019 10/2/2018            Who to contact     If you have questions or need follow up information about today's clinic visit or your schedule please contact [facility] [place] directly at [phone].  Normal or non-critical lab and imaging results will be communicated to you by MyChart, letter or phone within 4 business days after the clinic has received the results. If you do not hear from us within 7 days, please contact the clinic through MyChart or phone. If you have a critical or abnormal lab result, we will notify you by phone as soon as possible.  Submit refill requests through edo or call your pharmacy and they will forward the refill request to us. Please allow 3 business days for your refill to be completed.          Additional Information About Your Visit        Care EveryWhere ID     This is your Care EveryWhere ID. This could be used by other organizations to access your [facility] medical records  [id number]        Your Vitals Were     Height BMI (Body Mass Index)                5' 9.75\" (1.772 m) 31.5 kg/m2           Blood Pressure from Last 3 Encounters:   10/02/18 138/84   09/04/18 146/88   08/17/18 (!) 138/96    Weight from Last 3 Encounters:   10/02/18 218 lb (98.9 kg)   09/04/18 216 lb 6.4 oz (98.2 kg)   08/17/18 217 lb (98.4 kg)               Primary Care Provider Office Phone # Fax #    [facility] [phone] [address] [facility] [address]        Equal Access to Services     ANYA NGO AH: " Hadii aad ku hadsauravo Somagalisali, waaxda luqadaha, qaybta kaalllody tabares, judy barron. So [facility] [phone].    ATENCIÓN: Si senia meyer, tiene a sanders disposición servicios gratuitos de asistencia lingüística. Basilame al [phone].    We comply with applicable federal civil rights laws and [place] laws. We do not discriminate on the basis of race, color, national origin, age, disability, sex, sexual orientation, or gender identity.            Thank you!     Thank you for choosing [facility]  for your care. Our goal is always to provide you with excellent care. Hearing back from our patients is one way we can continue to improve our services. Please take a few minutes to complete the written survey that you may receive in the mail after your visit with us. Thank you!             Your Updated Medication List - Protect others around you: Learn how to safely use, store and throw away your medicines at www.disposemymeds.org.          This list is accurate as of 10/2/18  7:00 PM.  Always use your most recent med list.                   Brand Name Dispense Instructions for use Diagnosis    CELEXA PO      Take  by mouth.        * EPIPEN 2-DEBORAH 0.3 MG/0.3ML injection 2-pack   Generic drug:  EPINEPHrine      Reported on 4/6/2017        * EPINEPHrine 0.3 MG/0.3ML injection 2-pack    EPIPEN/ADRENACLICK/or ANY BX GENERIC EQUIV    0.6 mL    INJECT 0.3 MLS (0.3 MG) INTO THE MUSCLE AS NEEDED FOR ANAPHYLAXIS    Allergic to bees       ibuprofen 600 MG tablet    ADVIL/MOTRIN    100 tablet    Take 1 tablet by mouth every 8 hours as needed for pain.    Toe injury, Work-related condition       losartan 50 MG tablet    COZAAR     TAKE 1 TABLET EVERY DAY        * Notice:  This list has 2 medication(s) that are the same as other medications prescribed for you. Read the directions carefully, and ask your doctor or other care provider to review them with you.

## 2018-10-02 NOTE — PROGRESS NOTES
"Sports Medicine Clinic Visit    PCP: Clinic, [facility]    Dave WU Cherry is a 43 year old male who is seen in f/u up for Closed displaced fracture of shaft of fourth metacarpal bone of right hand with routine healing, subsequent encounter 8/7/18. Now 8 weeks out from injury.  Since last visit on 9/4/2018 patient splint removed and repeat radiograph taken prior to seeing the patient.   Continues to feel that his fingers and swollen and stiff.  Frustrated that it is not feeling much better.      **  Reports swollen and stiffness in his right hand.      Presents with wife.     Review of Systems  All other systems reviewed and are negative unless noted above.    Past Medical History:   Diagnosis Date     Hypertension      Past Surgical History:   Procedure Laterality Date     HEMORRHOIDECTOMY       ORTHOPEDIC SURGERY      right ankle surgery     This document serves as a record of the services and decisions personally performed and made by DO CAROLINE Hernandez. It was created on their behalf by Daryl Reyes, a trained medical scribe. The creation of this document is based the provider's statements to the medical scribe.  Daryl Reyes October 2, 2018 6:35 PM      Objective  /84  Ht 5' 9.75\" (1.772 m)  Wt 218 lb (98.9 kg)  BMI 31.5 kg/m2    GENERAL APPEARANCE: healthy, alert and no distress   GAIT: NORMAL  SKIN: no suspicious lesions or rashes  NEURO: Normal strength and tone, mentation intact and speech normal  PSYCH:  mentation appears normal and affect normal/bright  HEENT: no scleral icterus  CV: no extremity edema  RESP: nonlabored breathing      Exam:  Hand Exam (Right):  Inspection:  Diffuse swelling over right hand--palm, dorsally, into digits    Tender: Near ring finger MCP joint    Nontender: remainder of hand, including over fracture site    Regional pulses normal.  Capillary refill less than 2 seconds.  Normal sensation noted.      Moderate limitation in digit motion, primarily with " stiffness.    Radiology  Visualized radiographs of right hand taken on 10/2/2018, and reviewed the images with the patient and wife.  Impression: Three views of the right hand demonstrate again the oblique fourth metacarpal fracture.  No interval change in alignment when compared to prior films from 9/4/18.  There is no significant callus formation when compared to prior films.      Prior plain films:    Three views of the right hand demonstrate once again an oblique fracture   of the fourth metacarpal, unchanged in alignment compared to prior films.    There appears to be faint interval healing compared to images from   8/17/18, though fracture remains apparent and there is no significant   callus formation.    Jose Epstein DO, CAQ.    ===========================================================  Reviewed past pertinent radiograph taken on 9/4/2018    Three views of the right hand demonstrate once again an oblique fourth   metacarpal fracture. No significant overall change in appearance when   compared to films from 8/7/18.    Jose Epstein DO, CAQ    ===========================================================  Reviewed past pertinent radiograph taken on 8/7/2018    HAND THREE  VIEWS RIGHT 8/7/2018 8:43 PM      HISTORY: Injury of right upper arm, initial encounter.     COMPARISON: None.         IMPRESSION: Oblique fracture of the diaphysis of the fourth  metacarpal. No dislocation. No other fracture demonstrated.     LOUIE BOOTH MD      Assessment:  1. Closed displaced fracture of shaft of fourth metacarpal bone of right hand with routine healing, subsequent encounter        Plan:  Discussed the assessment with the patient and wife.  Clinically improving, radiographic healing is lagging.  We discussed treating this as a healing fracture, given he is improving though prominent swelling remains.  The alternative is to continue with support and consider advanced imaging to assess bony healing.  Given  nontender fracture site, duration of time from injury, will wean immobilization.  Referred to hand therapy next.  If any concerns about progress, reconsider CT.    Radiologic images reviewed and discussed with patient and wife today  We discussed the following: symptom treatment, activity modification/rest, imaging, rehab, potential for improvement with time and support for the affected area.     Following discussion, plan:  Topical Treatments: Ice as needed   Over the counter medication: Patient's preferred OTC medication as needed.  Supplementation of diet with vitamins and minerals; discussed calcium and vitamin D.  He is already supplementing with calcium and magnesium.  Rehab: Occupational Therapy: Referral placed  Activity Modification: as discussed  Begin weaning use of brace  Future consideration of CT of the right hand if persistent issues at the fracture site, or questions of healing that persist beyond ~12 weeks from injury.  Future consideration of bone stimulator if delayed union or nonunion.  Follow up: 3-4 weeks, sooner if needed.  Repeat plain films of hand at the recheck.  We discussed potentially concerning signs and symptoms related to the condition(s) listed above, including increase in pain, changing pain, and the patient was instructed to seek appropriate medical care if noted. All questions answered to patient's satisfaction. The patient expressed understanding of the plan.     Jose Epstein DO, CAQ            Disclaimer: This note consists of symbols derived from keyboarding, dictation and/or voice recognition software. As a result, there may be errors in the script that have gone undetected. Please consider this when interpreting information found in this chart.    The information in this document, created by the medical scribe for me, accurately reflects the services I personally performed and the decisions made by me. I have reviewed and approved this document for accuracy prior to  leaving the patient care area.

## 2018-10-02 NOTE — PATIENT INSTRUCTIONS
*Elevate right hand above elbow as much as possible    *May begin to wean use of splint    *Occupational Therapy

## 2018-10-02 NOTE — LETTER
"    10/2/2018         RE: Dave Cherry  [address]  [place] [address]        Dear Colleague,    Thank you for referring your patient, Dave Cherry, to the [facility] [place]. Please see a copy of my visit note below.    Sports Medicine Clinic Visit    PCP: Clinic, [facility]    Dave Cherry is a 43 year old male who is seen in f/u up for Closed displaced fracture of shaft of fourth metacarpal bone of right hand with routine healing, subsequent encounter 8/7/18. Now 8 weeks out from injury.  Since last visit on 9/4/2018 patient splint removed and repeat radiograph taken prior to seeing the patient.   Continues to feel that his fingers and swollen and stiff.  Frustrated that it is not feeling much better.      **  Reports swollen and stiffness in his right hand.      Presents with wife.     Review of Systems  All other systems reviewed and are negative unless noted above.    Past Medical History:   Diagnosis Date     Hypertension      Past Surgical History:   Procedure Laterality Date     HEMORRHOIDECTOMY       ORTHOPEDIC SURGERY      right ankle surgery     This document serves as a record of the services and decisions personally performed and made by DO CAROLINE Hernandez. It was created on their behalf by Daryl Reyes, a trained medical scribe. The creation of this document is based the provider's statements to the medical scribe.  Daryl Reyes October 2, 2018 6:35 PM      Objective  /84  Ht 5' 9.75\" (1.772 m)  Wt 218 lb (98.9 kg)  BMI 31.5 kg/m2    GENERAL APPEARANCE: healthy, alert and no distress   GAIT: NORMAL  SKIN: no suspicious lesions or rashes  NEURO: Normal strength and tone, mentation intact and speech normal  PSYCH:  mentation appears normal and affect normal/bright  HEENT: no scleral icterus  CV: no extremity edema  RESP: nonlabored breathing      Exam:  Hand Exam (Right):  Inspection:  Diffuse swelling over right hand--palm, dorsally, " into digits    Tender: Near ring finger MCP joint    Nontender: remainder of hand, including over fracture site    Regional pulses normal.  Capillary refill less than 2 seconds.  Normal sensation noted.      Moderate limitation in digit motion, primarily with stiffness.    Radiology  Visualized radiographs of right hand taken on 10/2/2018, and reviewed the images with the patient and wife.  Impression: Three views of the right hand demonstrate again the oblique fourth metacarpal fracture.  No interval change in alignment when compared to prior films from 9/4/18.  There is no significant callus formation when compared to prior films.      Prior plain films:    Three views of the right hand demonstrate once again an oblique fracture   of the fourth metacarpal, unchanged in alignment compared to prior films.    There appears to be faint interval healing compared to images from   8/17/18, though fracture remains apparent and there is no significant   callus formation.    Jose Epstein DO, CAQ.    ===========================================================  Reviewed past pertinent radiograph taken on 9/4/2018    Three views of the right hand demonstrate once again an oblique fourth   metacarpal fracture. No significant overall change in appearance when   compared to films from 8/7/18.    Jose Epstein DO, CAQ    ===========================================================  Reviewed past pertinent radiograph taken on 8/7/2018    HAND THREE  VIEWS RIGHT 8/7/2018 8:43 PM      HISTORY: Injury of right upper arm, initial encounter.     COMPARISON: None.         IMPRESSION: Oblique fracture of the diaphysis of the fourth  metacarpal. No dislocation. No other fracture demonstrated.     LOUIE BOOTH MD      Assessment:  1. Closed displaced fracture of shaft of fourth metacarpal bone of right hand with routine healing, subsequent encounter        Plan:  Discussed the assessment with the patient and wife.  Clinically  improving, radiographic healing is lagging.  We discussed treating this as a healing fracture, given he is improving though prominent swelling remains.  The alternative is to continue with support and consider advanced imaging to assess bony healing.  Given nontender fracture site, duration of time from injury, will wean immobilization.  Referred to hand therapy next.  If any concerns about progress, reconsider CT.    Radiologic images reviewed and discussed with patient and wife today  We discussed the following: symptom treatment, activity modification/rest, imaging, rehab, potential for improvement with time and support for the affected area.     Following discussion, plan:  Topical Treatments: Ice as needed   Over the counter medication: Patient's preferred OTC medication as needed.  Supplementation of diet with vitamins and minerals; discussed calcium and vitamin D.  He is already supplementing with calcium and magnesium.  Rehab: Occupational Therapy: Referral placed  Activity Modification: as discussed  Begin weaning use of brace  Future consideration of CT of the right hand if persistent issues at the fracture site, or questions of healing that persist beyond ~12 weeks from injury.  Future consideration of bone stimulator if delayed union or nonunion.  Follow up: 3-4 weeks, sooner if needed.  Repeat plain films of hand at the recheck.  We discussed potentially concerning signs and symptoms related to the condition(s) listed above, including increase in pain, changing pain, and the patient was instructed to seek appropriate medical care if noted. All questions answered to patient's satisfaction. The patient expressed understanding of the plan.     Jose Epstein DO, CAQ            Disclaimer: This note consists of symbols derived from keyboarding, dictation and/or voice recognition software. As a result, there may be errors in the script that have gone undetected. Please consider this when interpreting  information found in this chart.    The information in this document, created by the medical scribe for me, accurately reflects the services I personally performed and the decisions made by me. I have reviewed and approved this document for accuracy prior to leaving the patient care area.      Again, thank you for allowing me to participate in the care of your patient.        Sincerely,        Jose Epstein, DO

## 2018-10-22 ENCOUNTER — THERAPY VISIT (OUTPATIENT)
Dept: OCCUPATIONAL THERAPY | Facility: CLINIC | Age: 43
End: 2018-10-22
Payer: COMMERCIAL

## 2018-10-22 DIAGNOSIS — S62.324D CLOSED DISPLACED FRACTURE OF SHAFT OF FOURTH METACARPAL BONE OF RIGHT HAND WITH ROUTINE HEALING, SUBSEQUENT ENCOUNTER: ICD-10-CM

## 2018-10-22 DIAGNOSIS — M79.641 RIGHT HAND PAIN: ICD-10-CM

## 2018-10-22 PROCEDURE — 97110 THERAPEUTIC EXERCISES: CPT | Mod: GO | Performed by: OCCUPATIONAL THERAPIST

## 2018-10-22 NOTE — MR AVS SNAPSHOT
After Visit Summary   10/22/2018    Dave Cherry    MRN: [id number]           Patient Information     Date Of Birth          1975        Visit Information        Provider Department      10/22/2018 3:30 PM Taylor Damon [facility] [place]        Today's Diagnoses     Right hand pain        Closed displaced fracture of shaft of fourth metacarpal bone of right hand with routine healing, subsequent encounter           Follow-ups after your visit        Your next 10 appointments already scheduled     Oct 23, 2018  6:20 PM CDT   Return Visit with Jose Epstein DO   [facility] [place] ([facility] [place])    [address]  [place] [address] [phone]              Who to contact     If you have questions or need follow up information about today's clinic visit or your schedule please contact [facility] [place] directly at [phone].  Normal or non-critical lab and imaging results will be communicated to you by MyChart, letter or phone within 4 business days after the clinic has received the results. If you do not hear from us within 7 days, please contact the clinic through MyChart or phone. If you have a critical or abnormal lab result, we will notify you by phone as soon as possible.  Submit refill requests through [employer] or call your pharmacy and they will forward the refill request to us. Please allow 3 business days for your refill to be completed.          Additional Information About Your Visit        Care EveryWhere ID     This is your Care EveryWhere ID. This could be used by other organizations to access your [facility] medical records  [id number]         Blood Pressure from Last 3 Encounters:   10/02/18 138/84   09/04/18 146/88   08/17/18 (!) 138/96    Weight from Last 3 Encounters:   10/02/18 98.9 kg (218 lb)   09/04/18 98.2 kg (216 lb 6.4 oz)    08/17/18 98.4 kg (217 lb)              We Performed the Following     [facility] PROGRESS NOTES REPORT     THERAPEUTIC EXERCISES        Primary Care Provider Office Phone # Fax #    [facility] [phone] [address] [facility] [address]        Equal Access to Services     ANYA NGO : Hadii aad ku hadasho Soomaali, waaxda luqadaha, qaybta kaalmada adeegyada, waxay idiin hayaan adebeatrice gibbs laharitha . So [facility] [phone].    ATENCIÓN: Si habla español, tiene a sanders disposición servicios gratuitos de asistencia lingüística. [place] al [phone].    We comply with applicable federal civil rights laws and [place] laws. We do not discriminate on the basis of race, color, national origin, age, disability, sex, sexual orientation, or gender identity.            Thank you!     Thank you for choosing [facility]  for your care. Our goal is always to provide you with excellent care. Hearing back from our patients is one way we can continue to improve our services. Please take a few minutes to complete the written survey that you may receive in the mail after your visit with us. Thank you!             Your Updated Medication List - Protect others around you: Learn how to safely use, store and throw away your medicines at www.disposemymeds.org.          This list is accurate as of 10/22/18  4:05 PM.  Always use your most recent med list.                   Brand Name Dispense Instructions for use Diagnosis    CELEXA PO      Take  by mouth.        * EPIPEN 2-DEBORAH 0.3 MG/0.3ML injection 2-pack   Generic drug:  EPINEPHrine      Reported on 4/6/2017        * EPINEPHrine 0.3 MG/0.3ML injection 2-pack    EPIPEN/ADRENACLICK/or ANY BX GENERIC EQUIV    0.6 mL    INJECT 0.3 MLS (0.3 MG) INTO THE MUSCLE AS NEEDED FOR ANAPHYLAXIS    Allergic to bees       ibuprofen 600 MG tablet    ADVIL/MOTRIN    100 tablet    Take 1 tablet by mouth every 8 hours as needed for  pain.    Toe injury, Work-related condition       losartan 50 MG tablet    COZAAR     TAKE 1 TABLET EVERY DAY        * Notice:  This list has 2 medication(s) that are the same as other medications prescribed for you. Read the directions carefully, and ask your doctor or other care provider to review them with you.

## 2018-10-22 NOTE — PROGRESS NOTES
Hand Therapy Progress Note    Current Date:  10/22/2018    Reporting period is 8/23/2018 to 10/22/2018    Diagnosis:   Right ring MC shaft fracture  DOI: 8/7/18  Post:  10w 5d    Subjective:   Subjective changes noted by patient:  Patient reports slow healing of fracture, he continues to wear ulnar gutter orthosis at work. New MD orders 10/2/18.  Functional changes noted by patient:  No Change to Self Care Tasks (eating)  Patient has noted adverse reaction to:  None    Objective:  ROM:  AROM(PROM) 10/22/18    Right   Index MP -25/85 (0/95)   PIP 0/100   DIP 0/35   Long MP 0/65 (/75)   PIP -15/50 (-10/70)   DIP 0/20   Ring MP 0/50 (/65)   PIP -25/55 (-15/65)   DIP -5/20   Small MP 0/50 (/60)   PIP -15/45 (-10/55)   DIP -5/25     Wrist  10/22/18   AROM(PROM) Left Right   Extension 70 45   Flexion 75 40   RD 25 20   UD 45 20     Strength:   (Measured in pounds)    10/22/18   Trials Left Right   1  2  3 85  64  70 15+   Average: 73      Edema: Moderate of long, ring and small fingers    Sensation:  WNL throughout all nerve distributions; per patient report    Pain Report:  VAS(0-10) 8/23/18 10/22/18   At Rest: 0/10    With Use: 8-9/10 5/10   Location:  wrist and ring finger  Pain Quality:  Sharp  Frequency: intermittent    Pain is worst:  daytime  Exacerbated by:  Forgetting and trying to use hand for activity  Relieved by:  rest and pain meds    Please refer to the daily flowsheet for treatment provided today.     Assessment:  Patient presents with symptoms consistent with diagnosis of ring MC shaft fracture, with conservative intervention.     Patient's limitations or Problem List includes:  Pain, Decreased ROM/motion, Increased edema, Weakness and Decreased  of the right wrist and hand which interferes with the patient's ability to perform Self Care Tasks (dressing, eating, bathing), Work Tasks, Sleep Patterns, Recreational Activities, Household Chores and Driving  as compared to previous level of  function.    Rehab Potential:  Good - Return to full activity, some limitations    Patient will benefit from skilled Occupational Therapy to increase ROM, flexibility, overall strength and  strength and decrease pain and edema to return to previous activity level and resume normal daily tasks and to reach their rehab potential.    Barriers to Learning:  No barrier    Communication Issues:  Patient appears to be able to clearly communicate and understand verbal and written communication and follow directions correctly.    Chart Review: Chart Review and Simple history review with patient    Identified Performance Deficits: bathing/showering, dressing, feeding, hygiene and grooming, communication management, driving and community mobility, home establishment and management, meal preparation and cleanup, sleep, work and leisure activities    Assessment of Occupational Performance:  5 or more Performance Deficits    Clinical Decision Making (Complexity): Low complexity    Treatment Explanation:  The following has been discussed with the patient:  RX ordered/plan of care  Anticipated outcomes  Possible risks and side effects    Plan:  Frequency:  2 X week, once daily  Duration:  for 2 weeks tapering to 1 X a week over 6 weeks  Treatment Plan:    Modalities:  Fluidotherapy and Paraffin  Therapeutic Exercise:  AROM, AAROM, PROM, Tendon Gliding, Blocking, Extensor Tracking and Isotonics  Manual Techniques:  Joint mobilization  Orthotic Fabrication:  Static progressive orthosis  Self Care:  Self Care Tasks    Discharge Plan:  Achieve all LTG.  Independent in home treatment program.  Reach maximal therapeutic benefit.    Home Exercise Program:  Warmth for stiffness  AROM fingers E/F  Tendon gliding with table top and full fisting  PIP and DIP blocking exercises  PROM for composite and hook finger flexion  Tracking on table for IP extension   AROM wrist E/F and R/U  Encourage weaning of orthosis and increased use of hand  for functional activities     Next Visit:  Begin warmth with Paraffin and joint mobs next visit to facilitate ROM  Add PROM wrist E/F  Progress to strengthening   Add static progressive splinting to facilitate finger ROM as indicated   Complete UEFI next visit

## 2018-11-12 ENCOUNTER — RADIANT APPOINTMENT (OUTPATIENT)
Dept: GENERAL RADIOLOGY | Facility: CLINIC | Age: 43
End: 2018-11-12
Attending: PEDIATRICS
Payer: COMMERCIAL

## 2018-11-12 ENCOUNTER — OFFICE VISIT (OUTPATIENT)
Dept: ORTHOPEDICS | Facility: CLINIC | Age: 43
End: 2018-11-12
Payer: COMMERCIAL

## 2018-11-12 VITALS
HEIGHT: 70 IN | SYSTOLIC BLOOD PRESSURE: 136 MMHG | DIASTOLIC BLOOD PRESSURE: 88 MMHG | WEIGHT: 217 LBS | BODY MASS INDEX: 31.07 KG/M2

## 2018-11-12 DIAGNOSIS — S62.324G: Primary | ICD-10-CM

## 2018-11-12 DIAGNOSIS — S62.324D CLOSED DISPLACED FRACTURE OF SHAFT OF FOURTH METACARPAL BONE OF RIGHT HAND WITH ROUTINE HEALING, SUBSEQUENT ENCOUNTER: ICD-10-CM

## 2018-11-12 PROCEDURE — 73130 X-RAY EXAM OF HAND: CPT | Mod: RT | Performed by: PEDIATRICS

## 2018-11-12 PROCEDURE — 99213 OFFICE O/P EST LOW 20 MIN: CPT | Performed by: PEDIATRICS

## 2018-11-12 NOTE — MR AVS SNAPSHOT
"              After Visit Summary   11/12/2018    Dave Cherry    MRN: [id number]           Patient Information     Date Of Birth          1975        Visit Information        Provider Department      11/12/2018 6:20 PM Jose Epstein DO [facility]        Today's Diagnoses     Closed displaced fracture of shaft of fourth metacarpal bone of right hand with delayed healing, subsequent encounter    -  1      Care Instructions    CT right hand next. Call [phone] to schedule  We will call with results once completed          Follow-ups after your visit        Follow-up notes from your care team     Call patient with results      Future tests that were ordered for you today     Open Future Orders        Priority Expected Expires Ordered    CT Hand Right w/o Contrast Routine  11/12/2019 11/12/2018            Who to contact     If you have questions or need follow up information about today's clinic visit or your schedule please contact [facility] directly at [phone].  Normal or non-critical lab and imaging results will be communicated to you by [employer], letter or phone within 4 business days after the clinic has received the results. If you do not hear from us within 7 days, please contact the clinic through [employer] or phone. If you have a critical or abnormal lab result, we will notify you by phone as soon as possible.  Submit refill requests through [employer] or call your pharmacy and they will forward the refill request to us. Please allow 3 business days for your refill to be completed.          Additional Information About Your Visit        [employer] Information     [employer] lets you send messages to your doctor, view your test results, renew your prescriptions, schedule appointments and more. To sign up, go to [email or web address] . Click on \"Log in\" on the left side of the screen, which will take you to the Welcome page. Then " "click on \"Sign up Now\" on the right side of the page.     You will be asked to enter the access code listed below, as well as some personal information. Please follow the directions to create your username and password.     Your access code is: [email or web address]  Expires: 2/10/2019  7:19 PM     Your access code will  in 90 days. If you need help or a new code, please call your [facility] clinic or [phone].        Care EveryWhere ID     This is your Care EveryWhere ID. This could be used by other organizations to access your [facility] medical records  [id number]        Your Vitals Were     Height BMI (Body Mass Index)                5' 9.75\" (1.772 m) 31.36 kg/m2           Blood Pressure from Last 3 Encounters:   18 136/88   10/02/18 138/84   18 146/88    Weight from Last 3 Encounters:   18 217 lb (98.4 kg)   10/02/18 218 lb (98.9 kg)   18 216 lb 6.4 oz (98.2 kg)               Primary Care Provider Office Phone # Fax #    [facility] [phone] [address] [facility] [address]        Equal Access to Services     ANYA NGO : Hadii fernanda zafaro Soomaali, waaxda luqadaha, qaybta kaalmada adeegyada, judy angel . So [facility] [phone].    ATENCIÓN: Si habla español, tiene a sanders disposición servicios gratuitos de asistencia lingüística. Llame al [phone].    We comply with applicable federal civil rights laws and [place] laws. We do not discriminate on the basis of race, color, national origin, age, disability, sex, sexual orientation, or gender identity.            Thank you!     Thank you for choosing [facility]  for your care. Our goal is always to provide you with excellent care. Hearing back from our patients is one way we can continue to improve our services. Please take a few minutes to complete the written survey that you may receive in the mail after your visit with us. Thank " you!             Your Updated Medication List - Protect others around you: Learn how to safely use, store and throw away your medicines at www.disposemymeds.org.          This list is accurate as of 11/12/18  7:19 PM.  Always use your most recent med list.                   Brand Name Dispense Instructions for use Diagnosis    CELEXA PO      Take  by mouth.        * EPIPEN 2-DEBORAH 0.3 MG/0.3ML injection 2-pack   Generic drug:  EPINEPHrine      Reported on 4/6/2017        * EPINEPHrine 0.3 MG/0.3ML injection 2-pack    EPIPEN/ADRENACLICK/or ANY BX GENERIC EQUIV    0.6 mL    INJECT 0.3 MLS (0.3 MG) INTO THE MUSCLE AS NEEDED FOR ANAPHYLAXIS    Allergic to bees       ibuprofen 600 MG tablet    ADVIL/MOTRIN    100 tablet    Take 1 tablet by mouth every 8 hours as needed for pain.    Toe injury, Work-related condition       losartan 50 MG tablet    COZAAR     TAKE 1 TABLET EVERY DAY        * Notice:  This list has 2 medication(s) that are the same as other medications prescribed for you. Read the directions carefully, and ask your doctor or other care provider to review them with you.

## 2018-11-12 NOTE — LETTER
"    11/12/2018         RE: Dave Cherry  [phone]  [place] [address]        Dear Colleague,    Thank you for referring your patient, Dave Cherry, to the [facility]. Please see a copy of my visit note below.    Sports Medicine Clinic Visit    PCP: Clinic, [facility]    Dave Cherry is a 43 year old male who is seen in f/u up for Closed displaced fracture of shaft of fourth metacarpal bone of right hand with routine healing, subsequent encounter. Now 3 months out from injury.  Since last visit on 10/2/2018 patient has not been using a splint or brace. He has been completing occupational hand therapy. He reports he has seen an increase in his finger ROM but is unable to make a fist. He reports his hand feels week. He reports his had throbs after over use.     **  1 hand therapy visits since last seen here.  He continues with home exercises for hand motion.  Gets occasional throbbing in the palmar aspect of the hand, points near fracture site.  Stiffness in the digits, but this is improving compared to last visit.    Review of Systems  All other systems reviewed and are negative unless noted above.    Past Medical History:   Diagnosis Date     Hypertension      Past Surgical History:   Procedure Laterality Date     HEMORRHOIDECTOMY       ORTHOPEDIC SURGERY      right ankle surgery         Objective  /88 (BP Location: Left arm, Patient Position: Chair, Cuff Size: Adult Regular)  Ht 5' 9.75\" (1.772 m)  Wt 217 lb (98.4 kg)  BMI 31.36 kg/m2    GENERAL APPEARANCE: healthy, alert and no distress   GAIT: NORMAL  SKIN: no suspicious lesions or rashes  NEURO: Normal strength and tone, mentation intact and speech normal  PSYCH:  mentation appears normal and affect normal/bright  HEENT: no scleral icterus  CV: no extremity edema  RESP: nonlabored breathing    Exam:  Right hand:  Regional pulses normal.  Capillary refill less than 2 seconds.  Normal " sensation noted.   Nontender dorsal aspect of the fracture.  Mildly tender radial and ulnar aspects.  Some palmar tenderness over the fracture as well.  Mild stiffness with composite motion ring and small fingers.    Radiology  Visualized radiographs of right hand obtained today, and reviewed the images with the patient.  Impression: Three views of the right hand demonstrate again the oblique fracture of the fourth metacarpal.  No change in alignment compared to prior films from 10/2/18.  Fracture line remains visible without significant adjacent callus formation.      Assessment:  1. Closed displaced fracture of shaft of fourth metacarpal bone of right hand with routine healing, subsequent encounter        Plan:  Discussed the assessment with the patient and spouse.  Fracture should be well on its way to healing, though there is incomplete radiographic healing with some persistent symptoms.  We discussed potential for additional imaging with CT scan.  Plan this.  Continue with comfortable home exercises from therapy in the meantime.  He has supportive splint he may use for comfort as well.  Follow up: Call with CT results.  Pending results, considerations may be referral to orthopedic surgery, bone stimulator, return to hand therapy.  Questions answered. The patient indicates understanding of these issues and agrees with the plan.    Joes Epstein DO, CAQ          Disclaimer: This note consists of symbols derived from keyboarding, dictation and/or voice recognition software. As a result, there may be errors in the script that have gone undetected. Please consider this when interpreting information found in this chart.      Again, thank you for allowing me to participate in the care of your patient.        Sincerely,        Jose Epstein DO

## 2018-11-13 NOTE — PROGRESS NOTES
"Sports Medicine Clinic Visit    PCP: Clinic, [facility]    Dave WU Cherry is a 43 year old male who is seen in f/u up for Closed displaced fracture of shaft of fourth metacarpal bone of right hand with routine healing, subsequent encounter. Now 3 months out from injury.  Since last visit on 10/2/2018 patient has not been using a splint or brace. He has been completing occupational hand therapy. He reports he has seen an increase in his finger ROM but is unable to make a fist. He reports his hand feels week. He reports his had throbs after over use.     **  1 hand therapy visits since last seen here.  He continues with home exercises for hand motion.  Gets occasional throbbing in the palmar aspect of the hand, points near fracture site.  Stiffness in the digits, but this is improving compared to last visit.    Review of Systems  All other systems reviewed and are negative unless noted above.    Past Medical History:   Diagnosis Date     Hypertension      Past Surgical History:   Procedure Laterality Date     HEMORRHOIDECTOMY       ORTHOPEDIC SURGERY      right ankle surgery         Objective  /88 (BP Location: Left arm, Patient Position: Chair, Cuff Size: Adult Regular)  Ht 5' 9.75\" (1.772 m)  Wt 217 lb (98.4 kg)  BMI 31.36 kg/m2    GENERAL APPEARANCE: healthy, alert and no distress   GAIT: NORMAL  SKIN: no suspicious lesions or rashes  NEURO: Normal strength and tone, mentation intact and speech normal  PSYCH:  mentation appears normal and affect normal/bright  HEENT: no scleral icterus  CV: no extremity edema  RESP: nonlabored breathing    Exam:  Right hand:  Regional pulses normal.  Capillary refill less than 2 seconds.  Normal sensation noted.   Nontender dorsal aspect of the fracture.  Mildly tender radial and ulnar aspects.  Some palmar tenderness over the fracture as well.  Mild stiffness with composite motion ring and small fingers.    Radiology  Visualized radiographs of right hand obtained " today, and reviewed the images with the patient.  Impression: Three views of the right hand demonstrate again the oblique fracture of the fourth metacarpal.  No change in alignment compared to prior films from 10/2/18.  Fracture line remains visible without significant adjacent callus formation.      Assessment:  1. Closed displaced fracture of shaft of fourth metacarpal bone of right hand with routine healing, subsequent encounter        Plan:  Discussed the assessment with the patient and spouse.  Fracture should be well on its way to healing, though there is incomplete radiographic healing with some persistent symptoms.  We discussed potential for additional imaging with CT scan.  Plan this.  Continue with comfortable home exercises from therapy in the meantime.  He has supportive splint he may use for comfort as well.  Follow up: Call with CT results.  Pending results, considerations may be referral to orthopedic surgery, bone stimulator, return to hand therapy.  Questions answered. The patient indicates understanding of these issues and agrees with the plan.    Jose Epstein, , CAQ          Disclaimer: This note consists of symbols derived from keyboarding, dictation and/or voice recognition software. As a result, there may be errors in the script that have gone undetected. Please consider this when interpreting information found in this chart.

## 2018-11-15 ENCOUNTER — RADIANT APPOINTMENT (OUTPATIENT)
Dept: CT IMAGING | Facility: CLINIC | Age: 43
End: 2018-11-15
Attending: PEDIATRICS
Payer: COMMERCIAL

## 2018-11-15 DIAGNOSIS — S62.324G: ICD-10-CM

## 2018-11-15 PROCEDURE — 73200 CT UPPER EXTREMITY W/O DYE: CPT | Mod: TC

## 2018-11-16 ENCOUNTER — TELEPHONE (OUTPATIENT)
Dept: ORTHOPEDICS | Facility: CLINIC | Age: 43
End: 2018-11-16

## 2018-11-16 DIAGNOSIS — S62.324G: Primary | ICD-10-CM

## 2018-11-16 NOTE — TELEPHONE ENCOUNTER
Results for orders placed or performed in visit on 11/15/18   CT Hand Right w/o Contrast    Narrative    CT RIGHT HAND WITHOUT CONTRAST  11/15/2018 9:28 AM    HISTORY:  Evaluate fourth metacarpal fracture.    COMPARISON: Radiographs on 11/12/2018.    TECHNIQUE: Routine CT imaging is performed through the fourth and  fifth metacarpals of the right hand including the bases of the  adjacent fingers and a portion of the adjacent distal carpal row.  Radiation dose for this scan was reduced using automated exposure  control, adjustment of the mA and/or kV according to patient size, or  iterative reconstruction technique.     FINDINGS: Again seen is an oblique fracture of the fourth metacarpal  shaft. There is mild dorsal displacement and volar angulation of the  distal fragment. The margins of the fracture are somewhat smooth.  There is suggestion of a small amount of bone bridging along the far  distal aspect of the fracture. The majority of the fracture line does  not demonstrate any bone bridging. No other abnormality is seen.      Impression    IMPRESSION: There is only minimal bone bridging across the distal  aspect of the fourth metacarpal fracture. The majority of the fracture  does not demonstrate bone bridging.     SHIRA DIAL MD

## 2018-11-16 NOTE — TELEPHONE ENCOUNTER
Discuss with Dr. Epstein, due to limited fracture healing would refer to hand surgeon.      LVM for patient to call to discuss.  Isis Mills MS ATC

## 2018-11-16 NOTE — TELEPHONE ENCOUNTER
Called and spoke with patient.  Relayed results.  He is agreeable to referral.  Referral placed.  Isis Mills MS ATC

## 2019-01-03 PROBLEM — M79.641 RIGHT HAND PAIN: Status: RESOLVED | Noted: 2018-08-23 | Resolved: 2019-01-03

## 2019-01-03 PROBLEM — S62.324D CLOSED DISPLACED FRACTURE OF SHAFT OF FOURTH METACARPAL BONE OF RIGHT HAND WITH ROUTINE HEALING, SUBSEQUENT ENCOUNTER: Status: RESOLVED | Noted: 2018-08-23 | Resolved: 2019-01-03

## 2019-01-16 ENCOUNTER — TRANSFERRED RECORDS (OUTPATIENT)
Dept: HEALTH INFORMATION MANAGEMENT | Facility: CLINIC | Age: 44
End: 2019-01-16

## 2023-07-27 ENCOUNTER — ANCILLARY PROCEDURE (OUTPATIENT)
Dept: GENERAL RADIOLOGY | Facility: CLINIC | Age: 48
End: 2023-07-27
Attending: NURSE PRACTITIONER
Payer: COMMERCIAL

## 2023-07-27 ENCOUNTER — OFFICE VISIT (OUTPATIENT)
Dept: URGENT CARE | Facility: URGENT CARE | Age: 48
End: 2023-07-27
Payer: COMMERCIAL

## 2023-07-27 VITALS
OXYGEN SATURATION: 97 % | SYSTOLIC BLOOD PRESSURE: 162 MMHG | BODY MASS INDEX: 34.54 KG/M2 | DIASTOLIC BLOOD PRESSURE: 87 MMHG | WEIGHT: 239 LBS | RESPIRATION RATE: 16 BRPM | TEMPERATURE: 97.7 F | HEART RATE: 84 BPM

## 2023-07-27 DIAGNOSIS — S92.312B OPEN DISPLACED FRACTURE OF FIRST METATARSAL BONE OF LEFT FOOT, INITIAL ENCOUNTER: Primary | ICD-10-CM

## 2023-07-27 DIAGNOSIS — L03.116 CELLULITIS OF LEFT LOWER EXTREMITY: ICD-10-CM

## 2023-07-27 DIAGNOSIS — S99.922A FOOT INJURY, LEFT, INITIAL ENCOUNTER: ICD-10-CM

## 2023-07-27 DIAGNOSIS — Z23 NEED FOR VACCINATION: ICD-10-CM

## 2023-07-27 PROCEDURE — 99203 OFFICE O/P NEW LOW 30 MIN: CPT | Mod: 25 | Performed by: NURSE PRACTITIONER

## 2023-07-27 PROCEDURE — 90471 IMMUNIZATION ADMIN: CPT | Performed by: NURSE PRACTITIONER

## 2023-07-27 PROCEDURE — 73630 X-RAY EXAM OF FOOT: CPT | Mod: TC | Performed by: RADIOLOGY

## 2023-07-27 PROCEDURE — 90715 TDAP VACCINE 7 YRS/> IM: CPT | Performed by: NURSE PRACTITIONER

## 2023-07-27 RX ORDER — METOPROLOL SUCCINATE 50 MG/1
50 TABLET, EXTENDED RELEASE ORAL DAILY
COMMUNITY

## 2023-07-27 RX ORDER — SULFAMETHOXAZOLE/TRIMETHOPRIM 800-160 MG
1 TABLET ORAL 2 TIMES DAILY
Qty: 14 TABLET | Refills: 0 | Status: SHIPPED | OUTPATIENT
Start: 2023-07-27 | End: 2023-08-03

## 2023-07-27 ASSESSMENT — PAIN SCALES - GENERAL: PAINLEVEL: SEVERE PAIN (6)
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
## 2023-08-03 ENCOUNTER — OFFICE VISIT (OUTPATIENT)
Dept: PODIATRY | Facility: CLINIC | Age: 48
End: 2023-08-03
Payer: COMMERCIAL

## 2023-08-03 DIAGNOSIS — R23.4 ESCHAR: ICD-10-CM

## 2023-08-03 DIAGNOSIS — S93.529A TURF TOE, INITIAL ENCOUNTER: Primary | ICD-10-CM

## 2023-08-03 PROBLEM — D22.9 ATYPICAL NEVUS: Status: ACTIVE | Noted: 2023-02-10

## 2023-08-03 PROCEDURE — 99203 OFFICE O/P NEW LOW 30 MIN: CPT | Performed by: PODIATRIST

## 2023-08-03 RX ORDER — LOSARTAN POTASSIUM 100 MG/1
1 TABLET ORAL
COMMUNITY
Start: 2023-06-30
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
## 2023-08-03 NOTE — PATIENT INSTRUCTIONS
We wish you continued good healing. If you have any questions or concerns, please do not hesitate to contact us at  [phone]    [employer] (secure e-mail communication and access to your chart) to send a message or to make an appointment.    Please remember to call and schedule a follow up appointment if one was recommended at your earliest convenience.     PODIATRY CLINIC HOURS  TELEPHONE NUMBER    Dr. Nolan BERKOWITZPDOMINGA [facility]        Clinics:  Isidro Baker  [facility], [place], [facility]  Tuesday 1PM-6PM  [facility]  Wednesday 745AM-330PM  [place]  Thursday/Friday 745AM-230PM  [place]   1st and 3rd Mondays  845-430 PM   [place] APPOINTMENTS  [phone]    [place] APPOINTMENTS  [phone]    [place] APPOINTMENTS  [phone]        If you need a medication refill, please contact us you may need lab work and/or a follow up visit prior to your refill (i.e. Antifungal medications).  If MRI needed please call Imaging at [phone]   HOW DO I GET MY KNEE SCOOTER? Knee scooters can be picked up at ANY Medical Supply stores with your knee scooter Prescription.  OR  Bring your signed prescription to an [facility] showroom.  Call or bring in your Orthotics order to any Orthotics locations. Or call [phone]